# Patient Record
Sex: FEMALE | Race: WHITE | NOT HISPANIC OR LATINO
[De-identification: names, ages, dates, MRNs, and addresses within clinical notes are randomized per-mention and may not be internally consistent; named-entity substitution may affect disease eponyms.]

---

## 2022-07-28 ENCOUNTER — APPOINTMENT (OUTPATIENT)
Age: 77
End: 2022-07-28

## 2022-07-28 ENCOUNTER — LABORATORY RESULT (OUTPATIENT)
Age: 77
End: 2022-07-28

## 2022-07-28 VITALS
BODY MASS INDEX: 17.3 KG/M2 | HEIGHT: 62 IN | TEMPERATURE: 97.9 F | OXYGEN SATURATION: 100 % | SYSTOLIC BLOOD PRESSURE: 159 MMHG | HEART RATE: 65 BPM | DIASTOLIC BLOOD PRESSURE: 81 MMHG | WEIGHT: 94 LBS

## 2022-07-28 PROCEDURE — 99204 OFFICE O/P NEW MOD 45 MIN: CPT

## 2022-07-28 NOTE — CONSULT LETTER
[Dear  ___] : Dear  [unfilled], [Consult Letter:] : I had the pleasure of evaluating your patient, [unfilled]. [Please see my note below.] : Please see my note below. [Consult Closing:] : Thank you very much for allowing me to participate in the care of this patient.  If you have any questions, please do not hesitate to contact me. [Sincerely,] : Sincerely, [FreeTextEntry3] : Felix James MD

## 2022-07-28 NOTE — PHYSICAL EXAM
[FreeTextEntry1] : General: this is a pleasant patient in no acute distress\par \par HEENT conjunctiva are normal, oropharynx is clear, no tenderness in head\par \par CV: normal pulses, regular rate and rhythm, no peripheral edema noted\par \par Lungs: breathing is non-labored\par \par abd: soft and non-distended\par \par MSK:\par SLR: \par GEORGIE:\par range of motion: full C spine\par tinnels: negative at wrists\par spurling:\par Occipital nerve tenderness:\par \par Mental status:\par Alert and oriented to person, place and time\par Memory: consolidates\par Attention: grossly nl\par Language is normal \par \par Cranial Nerves:\par extra-occular movements in tact without nystagmus, normal saccades and smooth pursuit, visual fields full to confrontation, pupils equal, round and reactive to light. Face symmetric and facial strength symmetric, facial sensation symmetric, hearing present bilaterally to finger rub, tongue and uvula midline. neck flexion and extension normal strength.\par \par Motor: normal bulk and tone throughout. no abnormal movements.  Full 5/5 strength uppers and lower extremities proximally and distally except L thumb opposition 4/5\par \par Sensory: in tact and symmetric to vibration, light tough, pin prick, temperature except decreased vib in both feet and ankles, decreased temp to R knee, b/l hip flexion 4/5\par \par Cerebellar: normal finger-nose-finger bilaterally\par \par Reflexes: 2+ in the upper and lower extremities and symmetric.  toes are bilaterally downgoing.\par \par Gait: stable, able to tip toe heel and stumbles with tandem\par \par Stances:\par Romberg: sways and unstable\par Shapened romberg: unstable\par

## 2022-07-28 NOTE — HISTORY OF PRESENT ILLNESS
[FreeTextEntry1] : DANUTA RUANO is a 77 year who presents with paresthesias\par \par Referred by Dr Sunil Prasad\par \par She reports numbness in feet.  Probably at least 5 years of symptoms and mainly happens at night.  Primarily in the toes and feet.  Probably somewhat worse over time.  Much worse on the right.\par \par Also more recently has symptoms down the right leg that is annoying.\par \par Did drop frying pan on foot that made things worse this year\par \par She has bilateral hand stiffness that used to be worse when she was working. Hands have gotten much weaker over time. \par \par Denies diplopia, blurred vision, dysphagia, dysarthria, aphasia, bowel or bladder dysfunction, imbalance, falls. used to have bad migraines but not for a long time.\par

## 2022-07-29 ENCOUNTER — NON-APPOINTMENT (OUTPATIENT)
Age: 77
End: 2022-07-29

## 2022-07-29 LAB
CK SERPL-CCNC: 62 U/L
CRP SERPL-MCNC: <3 MG/L
ERYTHROCYTE [SEDIMENTATION RATE] IN BLOOD BY WESTERGREN METHOD: 2 MM/HR
ESTIMATED AVERAGE GLUCOSE: 111 MG/DL
HBA1C MFR BLD HPLC: 5.5 %
HCV AB SER QL: NONREACTIVE
HCV S/CO RATIO: 0.11 S/CO
HIV1+2 AB SPEC QL IA.RAPID: NONREACTIVE
T4 FREE SERPL-MCNC: 1.2 NG/DL
TSH SERPL-ACNC: 2.56 UIU/ML
VIT B12 SERPL-MCNC: 181 PG/ML

## 2022-08-15 LAB
ANA SER IF-ACNC: NEGATIVE
ASIALO-GM1 ANTIBODIES, IGG/IGM: 10 IV
ENA SS-A AB SER IA-ACNC: <0.2 AL
ENA SS-B AB SER IA-ACNC: <0.2 AL
ENDOMYSIUM IGA SER QL: NEGATIVE
ENDOMYSIUM IGA TITR SER: NORMAL
GD1A ANTIBODIES, IGG/IGM: 9 IV
GD1B ANTIBODIES, IGG/IGM: 18 IV
GLIADIN IGA SER QL: 5.2 UNITS
GLIADIN IGG SER QL: <5 UNITS
GLIADIN PEPTIDE IGA SER-ACNC: NEGATIVE
GLIADIN PEPTIDE IGG SER-ACNC: NEGATIVE
GM1 ANTIBODIES, IGG/IGM: 9 IV
GM2 ANTIBODIES, IGG/IGM: 13 IV
GQ1B ANTIBODIES, IGG/IGM: 8 IV
M PROTEIN SPEC IFE-MCNC: NORMAL
MAG AB SER QL: NEGATIVE
PARANEOPLASTIC AB PNL SER: NORMAL
TTG IGA SER IA-ACNC: <1.2 U/ML
TTG IGA SER-ACNC: NEGATIVE
TTG IGG SER IA-ACNC: <1.2 U/ML
TTG IGG SER IA-ACNC: NEGATIVE
VIT B1 SERPL-MCNC: 120.2 NMOL/L
VIT B6 SERPL-MCNC: 11.8 UG/L

## 2022-09-08 ENCOUNTER — NON-APPOINTMENT (OUTPATIENT)
Age: 77
End: 2022-09-08

## 2022-09-28 ENCOUNTER — APPOINTMENT (OUTPATIENT)
Age: 77
End: 2022-09-28

## 2022-10-13 ENCOUNTER — APPOINTMENT (OUTPATIENT)
Age: 77
End: 2022-10-13

## 2022-10-13 DIAGNOSIS — R20.2 PARESTHESIA OF SKIN: ICD-10-CM

## 2022-10-13 PROCEDURE — 95886 MUSC TEST DONE W/N TEST COMP: CPT

## 2022-10-13 PROCEDURE — 99214 OFFICE O/P EST MOD 30 MIN: CPT | Mod: 25

## 2022-10-13 PROCEDURE — 95911 NRV CNDJ TEST 9-10 STUDIES: CPT

## 2022-10-13 NOTE — PHYSICAL EXAM
[FreeTextEntry1] : General: this is a pleasant patient in no acute distress\par \par HEENT conjunctiva are normal, oropharynx is clear, no tenderness in head\par \par CV: normal pulses, regular rate and rhythm, no peripheral edema noted\par \par Lungs: breathing is non-labored\par \par abd: soft and non-distended\par \par MSK:\par SLR: \par GEORGIE:\par range of motion: full C spine\par tinnels: negative at wrists\par spurling:\par Occipital nerve tenderness:\par \par Mental status:\par Alert and oriented to person, place and time\par Memory: consolidates\par Attention: grossly nl\par Language is normal \par \par Cranial Nerves:\par extra-occular movements in tact without nystagmus, normal saccades and smooth pursuit, visual fields full to confrontation, pupils equal, round and reactive to light. Face symmetric and facial strength symmetric, facial sensation symmetric, hearing present bilaterally to finger rub, tongue and uvula midline. neck flexion and extension normal strength.\par \par Motor: normal bulk and tone throughout. no abnormal movements.  Full 5/5 strength uppers and lower extremities proximally and distally, hip flexion better than last visit\par \par Sensory: in tact and symmetric to vibration, light tough, pin prick, temperature except decreased vib in both feet and ankles, decreased temp to R knee,\par \par Cerebellar: normal finger-nose-finger bilaterally\par \par Reflexes: 2+ in the upper and lower extremities and symmetric.  toes are bilaterally downgoing.\par \par Gait: stable, able to tip toe heel and difficulty with tandem\par \par Stances:\par Romberg: sways and but more stable\par Shapened romberg: unstable\par

## 2022-10-13 NOTE — ASSESSMENT
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 this accounts for her percieved asymmetry of symptoms\par \par after total 6 months of injected b12 would switch to oral and check level, then re-check level after 3 months on oral

## 2022-10-13 NOTE — CONSULT LETTER
[Dear  ___] : Dear  [unfilled], [Courtesy Letter:] : I had the pleasure of seeing your patient, [unfilled], in my office today. [Please see my note below.] : Please see my note below. [Consult Closing:] : Thank you very much for allowing me to participate in the care of this patient.  If you have any questions, please do not hesitate to contact me. [Sincerely,] : Sincerely, [FreeTextEntry3] : Felix James MD

## 2022-10-13 NOTE — HISTORY OF PRESENT ILLNESS
[FreeTextEntry1] : DANUTA RUANO is a 77 year who returns to follow up for possible neuropathy\par \par last visit was 7/28/2022\par \par since last visit did go for injections of b12 by Dr Schroeder.  almost done with a month.\par \par then got worse, we had spoken on the phone on 9/8 about this and decided to bring her back for EMG.  she reports that since then she is actually getting a bit better\par \par \par

## 2022-10-13 NOTE — DATA REVIEWED
[de-identified] : XR L spine with misalignment and pretty severe DJD\par labs 7/29/2022 b12 181 otherwise unrevealing: HIV, CRP, TSH, Ft4, CK, ESR, hep C, A1C, lyme, sjogren, ganglioside, celiac, b6, b1, faby, immunofixation, pareneoplastic, mag

## 2023-02-10 ENCOUNTER — APPOINTMENT (OUTPATIENT)
Age: 78
End: 2023-02-10
Payer: MEDICARE

## 2023-02-10 VITALS
TEMPERATURE: 98 F | HEART RATE: 65 BPM | WEIGHT: 92 LBS | SYSTOLIC BLOOD PRESSURE: 169 MMHG | BODY MASS INDEX: 16.93 KG/M2 | HEIGHT: 62 IN | DIASTOLIC BLOOD PRESSURE: 82 MMHG | OXYGEN SATURATION: 98 %

## 2023-02-10 PROCEDURE — 99214 OFFICE O/P EST MOD 30 MIN: CPT

## 2023-02-10 NOTE — PHYSICAL EXAM
[FreeTextEntry1] : General: this is a pleasant patient in no acute distress\par \par HEENT conjunctiva are normal, oropharynx is clear, no tenderness in head\par \par CV: normal pulses, regular rate and rhythm, no peripheral edema noted\par \par Lungs: breathing is non-labored\par \par abd: soft and non-distended\par \par MSK: b/l hands red with some swelling worst in PIP joints\par SLR: \par GEORGIE:\par range of motion: full C spine\par tinnels: negative at wrists\par spurling:\par Occipital nerve tenderness:\par \par Mental status:\par Alert and oriented to person, place and time\par Memory: consolidates\par Attention: grossly nl\par Language is normal \par \par Cranial Nerves:\par extra-occular movements in tact without nystagmus, normal saccades and smooth pursuit, visual fields full to confrontation, pupils equal, round and reactive to light. Face symmetric and facial strength symmetric, facial sensation symmetric, hearing present bilaterally to finger rub, tongue and uvula midline. neck flexion and extension normal strength.\par \par Motor: normal bulk and tone throughout. no abnormal movements.  Full 5/5 strength uppers and lower extremities proximally and distally, R hip flexion 4/5, L hip flexion 4+/5, b/l hip abduction 4+/5\par \par Sensory: in tact and symmetric to vibration, light tough, pin prick, temperature except decreased vib in both feet and ankles, decreased temp to R knee,\par \par Cerebellar: normal finger-nose-finger bilaterally\par \par Reflexes: 2+ in the upper and lower extremities and symmetric.  toes are bilaterally downgoing.\par \par Gait: stable, able to tip toe heel and more difficulty with tandem\par \par Stances:\par Romberg: sways but able to maintain\par Shapened romberg: unstable\par

## 2023-02-10 NOTE — HISTORY OF PRESENT ILLNESS
[FreeTextEntry1] : DANUTA RUANO is a 77 year who returns to follow up for possible neuroapthy\par \par last visit was 10/13/2022 for EMG\par \par since last visit having a lot of muscle stiffness and pain.  also has had fecal incontinence. this has been for a couple of weeks and has used a pad.  it has sligtly happened in years past but never like this.  legs also feel more numb in right more than left toes.  \par \par continues b12 injections\par \par hands have been getting red this year. skin not healing well on hands, having large cracks develop.\par \par \par

## 2023-02-10 NOTE — DATA REVIEWED
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

## 2023-03-29 ENCOUNTER — OUTPATIENT (OUTPATIENT)
Dept: OUTPATIENT SERVICES | Facility: HOSPITAL | Age: 78
LOS: 1 days | End: 2023-03-29
Payer: MEDICARE

## 2023-03-29 ENCOUNTER — APPOINTMENT (OUTPATIENT)
Dept: MRI IMAGING | Facility: HOSPITAL | Age: 78
End: 2023-03-29

## 2023-03-29 PROCEDURE — 72148 MRI LUMBAR SPINE W/O DYE: CPT

## 2023-03-29 PROCEDURE — 72148 MRI LUMBAR SPINE W/O DYE: CPT | Mod: 26

## 2023-04-03 ENCOUNTER — APPOINTMENT (OUTPATIENT)
Dept: ORTHOPEDIC SURGERY | Facility: CLINIC | Age: 78
End: 2023-04-03
Payer: MEDICARE

## 2023-04-03 VITALS
HEIGHT: 62 IN | OXYGEN SATURATION: 96 % | DIASTOLIC BLOOD PRESSURE: 71 MMHG | BODY MASS INDEX: 17.48 KG/M2 | HEART RATE: 82 BPM | SYSTOLIC BLOOD PRESSURE: 114 MMHG | WEIGHT: 95 LBS

## 2023-04-03 PROCEDURE — 99204 OFFICE O/P NEW MOD 45 MIN: CPT

## 2023-04-03 PROCEDURE — 72083 X-RAY EXAM ENTIRE SPI 4/5 VW: CPT

## 2023-04-21 ENCOUNTER — APPOINTMENT (OUTPATIENT)
Dept: CT IMAGING | Facility: HOSPITAL | Age: 78
End: 2023-04-21
Payer: MEDICARE

## 2023-04-21 ENCOUNTER — OUTPATIENT (OUTPATIENT)
Dept: OUTPATIENT SERVICES | Facility: HOSPITAL | Age: 78
LOS: 1 days | End: 2023-04-21
Payer: MEDICARE

## 2023-04-21 PROCEDURE — 72131 CT LUMBAR SPINE W/O DYE: CPT

## 2023-04-21 PROCEDURE — 72131 CT LUMBAR SPINE W/O DYE: CPT | Mod: 26

## 2023-05-01 NOTE — DISCUSSION/SUMMARY
[de-identified] : Severe mutilevel stenosis; numnbess in lower extremities may be related to stenosis vs. polyneuropoathy from emg vs. both. scoliosis and severe lumbar degenerative changes.  history of \par Recommend Endocrine consult (referral given)\par Recommend urology consult\par CT lumbar\par Follow up after testing

## 2023-05-01 NOTE — HISTORY OF PRESENT ILLNESS
[de-identified] : Referred by dr. jl dias.  has had intermittent incontinence for the past year.  Reports numbness in the feet chronic. now having increased numbness in legs..  does have intermittent radicular symtpoms.

## 2023-05-01 NOTE — PHYSICAL EXAM
[de-identified] : Physical Exam:\par \par General: patient is well developed, well nourished, in no acute \par distress, alert and oriented x 3. \par \par Mood and affect: normal\par \par Respiratory: no respiratory distress noted\par \par Skin: no scars over spine, skin intact, no erythema, increased warmth\par \par Alignment:The spine is well compensated in the coronal and sagittal plane.  \par \par Gait: The patient is able to toe walk and heel walk without difficulty. The patient is able to tandem gait without difficutly.\par \par Palpation: no tenderness to palpation spine or paraspinal region\par \par Range of motion: Lumbar spine ROM is restricted\par \par Neurologic Exam:\par Motor: Manual Muscle testing in the lower extremities is 5 out of 5 in all muscle groups. There is no evidence of muscular atrophy in the lower extremities. Sensory: Sensation to light touch is grossly intact in the lower extremities\par \par Reflexes: DTR are present and symmetric throughout, no clonus, plantar responses are flexor\par \par Hip Exam: No pain with internal or external rotation of hips bilaterally\par \par Special tests: Straight leg raise test negative.  Cross straight leg test negative.  GEORGIE test negative\par \par Vascular: Examination of the peripheral vascular system demonstrates no evidence of congestion or edema. no evidence of lymphedema bilateral lower extremities, pulses are present and symmetric in both lower extremities. [de-identified] : Xray 4/3/2023: my read: scoliosis, lumbar flatback from severe degenerative disc disease l3/4, L4/5, L5/S1.  Grade 1/2 spondylolisthesis of L4/5.  significant pelvic compensation with moderate forward SVA.\par \par Lumbar MRI 3/29/23: T12/L1: mild to moderate stenosis; L1/2: moderate to severe stenosis; L2/3: moderate to severe canal stenosis; L3/4:severe canal stenosis; L4/5 severe bilateral lateral recess stenosis, moderate to severe canal stenosis; L5/s1: moderate lateral recess stenosis, moderate foarminal stenosis; right severe foraminal stenosis from scoliosis and degenerative changes l2/3, l3/4, l4/5

## 2023-05-19 ENCOUNTER — APPOINTMENT (OUTPATIENT)
Age: 78
End: 2023-05-19
Payer: MEDICARE

## 2023-05-19 DIAGNOSIS — G62.9 POLYNEUROPATHY, UNSPECIFIED: ICD-10-CM

## 2023-05-19 DIAGNOSIS — G57.31 LESION OF LATERAL POPLITEAL NERVE, RIGHT LOWER LIMB: ICD-10-CM

## 2023-05-19 PROCEDURE — 99214 OFFICE O/P EST MOD 30 MIN: CPT

## 2023-05-19 NOTE — HISTORY OF PRESENT ILLNESS
[FreeTextEntry1] : DANUTA RUANO is a 77 year who returns to follow up for numbness in the legs\par \par last visit was 2/10/2023\par \par since last visit had imaging, here to review\par \par \par

## 2023-05-19 NOTE — DATA REVIEWED
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

## 2023-05-19 NOTE — CONSULT LETTER
[Dear  ___] : Dear  [unfilled], [Please see my note below.] : Please see my note below. [Consult Closing:] : Thank you very much for allowing me to participate in the care of this patient.  If you have any questions, please do not hesitate to contact me. [Sincerely,] : Sincerely, [FreeTextEntry1] : Ms Orozco is transferring to your care for primary care.  I saw her in the office today. [FreeTextEntry3] : Felix James MD

## 2023-05-23 ENCOUNTER — APPOINTMENT (OUTPATIENT)
Dept: ORTHOPEDIC SURGERY | Facility: CLINIC | Age: 78
End: 2023-05-23
Payer: MEDICARE

## 2023-05-23 PROCEDURE — 99214 OFFICE O/P EST MOD 30 MIN: CPT

## 2023-06-12 ENCOUNTER — APPOINTMENT (OUTPATIENT)
Dept: ORTHOPEDIC SURGERY | Facility: CLINIC | Age: 78
End: 2023-06-12
Payer: MEDICARE

## 2023-06-12 PROCEDURE — 99214 OFFICE O/P EST MOD 30 MIN: CPT

## 2023-06-15 NOTE — END OF VISIT
[FreeTextEntry3] : All medical record entries made by the Scribe were at my, Dr. Scott Tomas, direction and personally dictated by me on 06/12/2023. I have reviewed the chart and agree that the record accurately reflects my personal performance of the history, physical exam, assessment and plan. I have also personally directed, reviewed, and agreed with the chart.

## 2023-06-15 NOTE — DISCUSSION/SUMMARY
[de-identified] : Severe mutilevel stenosis; numbness in lower extremities may be related to stenosis vs. polyneuropoathy from emg vs. both. scoliosis and severe lumbar degenerative changes, Cervical disc disease, cervical foraminal stenosis\par \par I discussed my findings with the patient and her . Given the extent of her low back pathology and relative mild symptoms at this point, it seems prudent to build up her bone density with an anabolic agent in the mean time as her surgery would be extensive with higher than average risk of mechanical complications due to osteoporosis. She will follow up with me in 3 to 4 months, certainly sooner if there are any issues. She has an upcoming appointment with Dr. Almaraz of Endocrinology for her bone density.

## 2023-06-15 NOTE — PHYSICAL EXAM
[de-identified] : Physical Exam:\par \par General: patient is well developed, well nourished, in no acute \par distress, alert and oriented x 3. \par \par Mood and affect: normal\par \par Respiratory: no respiratory distress noted\par \par Skin: no scars over spine, skin intact, no erythema, increased warmth\par \par Alignment:The spine is well compensated in the coronal and sagittal plane.  \par \par Gait: The patient is able to toe walk and heel walk without difficulty. The patient is able to tandem gait without difficutly.\par \par Palpation: no tenderness to palpation spine or paraspinal region\par \par Range of motion: Lumbar spine ROM is restricted\par \par Neurologic Exam:\par Motor: Manual Muscle testing in the lower extremities is 5 out of 5 in all muscle groups. There is no evidence of muscular atrophy in the lower extremities. Sensory: Sensation to light touch is grossly intact in the lower extremities\par \par Reflexes: DTR are present and symmetric throughout, no clonus, plantar responses are flexor\par \par Hip Exam: No pain with internal or external rotation of hips bilaterally\par \par Special tests: Straight leg raise test negative.  Cross straight leg test negative.  GEORGIE test negative\par \par Vascular: Examination of the peripheral vascular system demonstrates no evidence of congestion or edema. no evidence of lymphedema bilateral lower extremities, pulses are present and symmetric in both lower extremities. [de-identified] : MRI Cervical Spine noncontrast 06/08/2023 [my read]: Multilevel disc degeneration, severe, with facet arthropathy. At C5/6 and C6/7 there is mild to moderate central stenosis and some cord effacement without significant compression or cord signal change. There is multilevel foraminal stenosis. \par \par CT Lumbar Spine 04/21/2023: Severe degenerative disc disease and scoliosis thoracolumbar spine. Grade 1 spondylolisthesis of L3/4, L4/5, and L5/S1. No pars defects seen. Review of her bone density of the lumbar spine demonstrates Hounsfield Units of approximately 120 to 140. \par \par Xray 4/3/2023: my read: scoliosis, lumbar flatback from severe degenerative disc disease L3/4, L4/5, L5/S1.  Grade 1/2 spondylolisthesis of L4/5.  significant pelvic compensation with moderate forward SVA.\par \par Lumbar MRI 3/29/23: T12/L1: mild to moderate stenosis; L1/2: moderate to severe stenosis; L2/3: moderate to severe canal stenosis; L3/4:severe canal stenosis; L4/5 severe bilateral lateral recess stenosis, moderate to severe canal stenosis; L5/s1: moderate lateral recess stenosis, moderate foarminal stenosis; right severe foraminal stenosis from scoliosis and degenerative changes l2/3, L3/4, l4/5

## 2023-06-15 NOTE — ADDENDUM
[FreeTextEntry1] : Documented by Lorin Perdomo acting as a scribe for Dr. Scott Tomas on 06/13/2023.

## 2023-06-16 NOTE — ADDENDUM
[FreeTextEntry1] : Documented by Lorin Perdomo acting as a scribe for Dr. Scott Tomas on 05/31/2023.

## 2023-06-16 NOTE — PHYSICAL EXAM
[de-identified] : Physical Exam:\par \par General: patient is well developed, well nourished, in no acute \par distress, alert and oriented x 3. \par \par Mood and affect: normal\par \par Respiratory: no respiratory distress noted\par \par Skin: no scars over spine, skin intact, no erythema, increased warmth\par \par Alignment:The spine is well compensated in the coronal and sagittal plane.  \par \par Gait: The patient is able to toe walk and heel walk without difficulty. The patient is able to tandem gait without difficutly.\par \par Palpation: no tenderness to palpation spine or paraspinal region\par \par Range of motion: Lumbar spine ROM is restricted\par \par Neurologic Exam:\par Motor: Manual Muscle testing in the lower extremities is 5 out of 5 in all muscle groups. There is no evidence of muscular atrophy in the lower extremities. Sensory: Sensation to light touch is grossly intact in the lower extremities\par \par Reflexes: DTR are present and symmetric throughout, no clonus, plantar responses are flexor\par \par Hip Exam: No pain with internal or external rotation of hips bilaterally\par \par Special tests: Straight leg raise test negative.  Cross straight leg test negative.  GEORGIE test negative\par \par Vascular: Examination of the peripheral vascular system demonstrates no evidence of congestion or edema. no evidence of lymphedema bilateral lower extremities, pulses are present and symmetric in both lower extremities. [de-identified] : CT Lumbar Spine 04/21/2023: Severe degenerative disc disease and scoliosis thoracolumbar spine. Grade 1 spondylolisthesis of L3/4, L4/5, and L5/S1. No pars defects seen. Review of her bone density of the lumbar spine demonstrates Hounsfield Units of approximately 120 to 140. \par \par Xray 4/3/2023: my read: scoliosis, lumbar flatback from severe degenerative disc disease L3/4, L4/5, L5/S1.  Grade 1/2 spondylolisthesis of L4/5.  significant pelvic compensation with moderate forward SVA.\par \par Lumbar MRI 3/29/23: T12/L1: mild to moderate stenosis; L1/2: moderate to severe stenosis; L2/3: moderate to severe canal stenosis; L3/4:severe canal stenosis; L4/5 severe bilateral lateral recess stenosis, moderate to severe canal stenosis; L5/s1: moderate lateral recess stenosis, moderate foarminal stenosis; right severe foraminal stenosis from scoliosis and degenerative changes l2/3, L3/4, l4/5

## 2023-06-16 NOTE — END OF VISIT
[FreeTextEntry3] : All medical record entries made by the Scribe were at my, Dr. Scott Tomas, direction and personally dictated by me on 05/23/2023. I have reviewed the chart and agree that the record accurately reflects my personal performance of the history, physical exam, assessment and plan. I have also personally directed, reviewed, and agreed with the chart.

## 2023-06-16 NOTE — DISCUSSION/SUMMARY
[de-identified] : Severe mutilevel stenosis; numbness in lower extremities may be related to stenosis vs. polyneuropoathy from emg vs. both. scoliosis and severe lumbar degenerative changes. \par \par I discussed my findings with the patient. The patient does not wish to undergo surgery at this time. She would also like to address her bone density before considering surgery. She is neurologically stable, and I think this is reasonable. We  discussed that at some point she will need surgery for her lumbar spine, and this would likely come in the form of a thoracolumbar fusion with a multilevel lumbar decompression. The patient should follow up with me after her visit with Dr. Almaraz. She should follow up with me sooner if needed.

## 2023-06-16 NOTE — HISTORY OF PRESENT ILLNESS
[de-identified] : Follow up 05/23/2023: Ms. Orozco is here for follow up. She had a CT of her Lumbar Spine and is here for review. She reports that she has not had any issues with urinary incontinence since her last visit with me 04/03/2023. She has been walking and functioning well though does feel numbness in her feet that is chronic and has had increased numbness in her legs over the past 6 to 12 months. She does have an intermittent radicular symptoms. She has an upcoming appointment with Dr. Orly Almaraz.\par \par Initial visit 04/03/2023: Referred by dr. jl dias.  has had intermittent incontinence for the past year.  Reports numbness in the feet chronic. now having increased numbness in legs..  does have intermittent radicular symtpoms.

## 2023-07-13 ENCOUNTER — APPOINTMENT (OUTPATIENT)
Dept: ENDOCRINOLOGY | Facility: CLINIC | Age: 78
End: 2023-07-13
Payer: MEDICARE

## 2023-07-13 VITALS
DIASTOLIC BLOOD PRESSURE: 85 MMHG | HEART RATE: 59 BPM | HEIGHT: 62 IN | BODY MASS INDEX: 17.48 KG/M2 | WEIGHT: 95 LBS | SYSTOLIC BLOOD PRESSURE: 152 MMHG

## 2023-07-13 DIAGNOSIS — Z87.891 PERSONAL HISTORY OF NICOTINE DEPENDENCE: ICD-10-CM

## 2023-07-13 DIAGNOSIS — Z82.62 FAMILY HISTORY OF OSTEOPOROSIS: ICD-10-CM

## 2023-07-13 LAB
25(OH)D3 SERPL-MCNC: 31.7 NG/ML
ALBUMIN SERPL ELPH-MCNC: 4.8 G/DL
ALP BLD-CCNC: 84 U/L
ALT SERPL-CCNC: 24 U/L
ANION GAP SERPL CALC-SCNC: 10 MMOL/L
AST SERPL-CCNC: 21 U/L
BILIRUB SERPL-MCNC: 1.1 MG/DL
BUN SERPL-MCNC: 20 MG/DL
CALCIUM SERPL-MCNC: 10.4 MG/DL
CALCIUM SERPL-MCNC: 10.4 MG/DL
CHLORIDE SERPL-SCNC: 102 MMOL/L
CO2 SERPL-SCNC: 27 MMOL/L
CREAT SERPL-MCNC: 0.64 MG/DL
EGFR: 91 ML/MIN/1.73M2
GLUCOSE SERPL-MCNC: 87 MG/DL
MAGNESIUM SERPL-MCNC: 2.3 MG/DL
PARATHYROID HORMONE INTACT: 66 PG/ML
PHOSPHATE SERPL-MCNC: 3.5 MG/DL
POTASSIUM SERPL-SCNC: 4.9 MMOL/L
PROT SERPL-MCNC: 6.8 G/DL
SODIUM SERPL-SCNC: 139 MMOL/L
TSH SERPL-ACNC: 2.01 UIU/ML

## 2023-07-13 PROCEDURE — 99204 OFFICE O/P NEW MOD 45 MIN: CPT

## 2023-07-13 RX ORDER — CYANOCOBALAMIN 1000 UG/ML
1000 INJECTION INTRAMUSCULAR; SUBCUTANEOUS
Qty: 2 | Refills: 0 | Status: DISCONTINUED | COMMUNITY
Start: 2022-08-01 | End: 2023-07-13

## 2023-07-14 LAB
ALBUMIN MFR SERPL ELPH: 63.3 %
ALBUMIN SERPL-MCNC: 4.3 G/DL
ALBUMIN/GLOB SERPL: 1.7 RATIO
ALPHA1 GLOB MFR SERPL ELPH: 4.3 %
ALPHA1 GLOB SERPL ELPH-MCNC: 0.3 G/DL
ALPHA2 GLOB MFR SERPL ELPH: 11.5 %
ALPHA2 GLOB SERPL ELPH-MCNC: 0.8 G/DL
B-GLOBULIN MFR SERPL ELPH: 10.2 %
B-GLOBULIN SERPL ELPH-MCNC: 0.7 G/DL
GAMMA GLOB FLD ELPH-MCNC: 0.7 G/DL
GAMMA GLOB MFR SERPL ELPH: 10.7 %
INTERPRETATION SERPL IEP-IMP: NORMAL
PROT SERPL-MCNC: 6.8 G/DL
PROT SERPL-MCNC: 6.8 G/DL

## 2023-07-15 ENCOUNTER — NON-APPOINTMENT (OUTPATIENT)
Age: 78
End: 2023-07-15

## 2023-07-17 ENCOUNTER — APPOINTMENT (OUTPATIENT)
Dept: INTERNAL MEDICINE | Facility: CLINIC | Age: 78
End: 2023-07-17
Payer: MEDICARE

## 2023-07-17 VITALS
WEIGHT: 95 LBS | OXYGEN SATURATION: 100 % | RESPIRATION RATE: 16 BRPM | HEART RATE: 58 BPM | SYSTOLIC BLOOD PRESSURE: 169 MMHG | BODY MASS INDEX: 17.48 KG/M2 | TEMPERATURE: 94 F | DIASTOLIC BLOOD PRESSURE: 75 MMHG | HEIGHT: 62 IN

## 2023-07-17 DIAGNOSIS — B37.31 ACUTE CANDIDIASIS OF VULVA AND VAGINA: ICD-10-CM

## 2023-07-17 LAB
ALBUPE: 23.3 %
ALPHA1UPE: 32.6 %
ALPHA2UPE: 20.7 %
BETAUPE: 11.8 %
GAMMAUPE: 11.6 %
IGA 24H UR QL IFE: NORMAL
KAPPA LC 24H UR QL: NORMAL
PROT PATTERN 24H UR ELPH-IMP: NORMAL
PROT UR-MCNC: 4 MG/DL
PROT UR-MCNC: 4 MG/DL

## 2023-07-17 PROCEDURE — 99203 OFFICE O/P NEW LOW 30 MIN: CPT | Mod: 25

## 2023-07-17 PROCEDURE — 96372 THER/PROPH/DIAG INJ SC/IM: CPT

## 2023-07-17 RX ORDER — CETIRIZINE HYDROCHLORIDE 10 MG/1
10 TABLET, FILM COATED ORAL DAILY
Qty: 90 | Refills: 3 | Status: ACTIVE | COMMUNITY
Start: 2023-07-17

## 2023-07-17 RX ORDER — CYANOCOBALAMIN 1000 UG/ML
1000 INJECTION INTRAMUSCULAR; SUBCUTANEOUS
Qty: 0 | Refills: 0 | Status: COMPLETED | OUTPATIENT
Start: 2023-07-17

## 2023-07-17 RX ORDER — LISINOPRIL 30 MG/1
TABLET ORAL
Refills: 0 | Status: COMPLETED | COMMUNITY
End: 2023-07-17

## 2023-07-17 RX ORDER — FLUCONAZOLE 150 MG/1
150 TABLET ORAL
Qty: 1 | Refills: 0 | Status: COMPLETED | COMMUNITY
Start: 2023-07-17 | End: 2023-07-17

## 2023-07-17 RX ORDER — MINOCYCLINE HYDROCHLORIDE 100 MG/1
100 CAPSULE ORAL DAILY
Refills: 0 | Status: COMPLETED | COMMUNITY
Start: 2023-07-17 | End: 2023-07-17

## 2023-07-17 RX ORDER — DIPHENHYDRAMINE HYDROCHLORIDE 12.5 MG/5ML
12.5 SOLUTION ORAL 3 TIMES DAILY
Refills: 0 | Status: ACTIVE | COMMUNITY
Start: 2023-07-17

## 2023-07-17 RX ADMIN — CYANOCOBALAMIN 0 MCG/ML: 1000 INJECTION INTRAMUSCULAR; SUBCUTANEOUS at 00:00

## 2023-07-17 NOTE — HISTORY OF PRESENT ILLNESS
[FreeTextEntry8] : annual due 12/2023\par here to est care; previous PCP passed away\par \par h/o anxiety\par - needs xanax refill\par \par h/o B12 def\par - on monthly b12 injection; pt gets it from pharmacy and is administered in office\par \par OP\par - follows w/ endo\par - needs to be treated before ortho can address spine issues\par \par multiple allergies\par - on topical steroids as needed; unusually in the winter months

## 2023-07-17 NOTE — PHYSICAL EXAM
[No Acute Distress] : no acute distress [Well Nourished] : well nourished [Well Developed] : well developed [Well-Appearing] : well-appearing [Normal Sclera/Conjunctiva] : normal sclera/conjunctiva [EOMI] : extraocular movements intact [Normal Outer Ear/Nose] : the outer ears and nose were normal in appearance [No Respiratory Distress] : no respiratory distress  [No Accessory Muscle Use] : no accessory muscle use [Clear to Auscultation] : lungs were clear to auscultation bilaterally [Normal Rate] : normal rate  [Regular Rhythm] : with a regular rhythm [Normal S1, S2] : normal S1 and S2 [No Murmur] : no murmur heard [No Edema] : there was no peripheral edema [No HSM] : no HSM [No Focal Deficits] : no focal deficits [Normal Gait] : normal gait [Normal Affect] : the affect was normal [Alert and Oriented x3] : oriented to person, place, and time [Normal Mood] : the mood was normal [Normal Insight/Judgement] : insight and judgment were intact

## 2023-07-18 ENCOUNTER — RX RENEWAL (OUTPATIENT)
Age: 78
End: 2023-07-18

## 2023-07-19 LAB — ALP BONE SERPL-MCNC: 16.7 UG/L

## 2023-08-01 ENCOUNTER — NON-APPOINTMENT (OUTPATIENT)
Age: 78
End: 2023-08-01

## 2023-08-01 ENCOUNTER — TRANSCRIPTION ENCOUNTER (OUTPATIENT)
Age: 78
End: 2023-08-01

## 2023-08-03 ENCOUNTER — APPOINTMENT (OUTPATIENT)
Dept: UROLOGY | Facility: CLINIC | Age: 78
End: 2023-08-03

## 2023-08-03 ENCOUNTER — NON-APPOINTMENT (OUTPATIENT)
Age: 78
End: 2023-08-03

## 2023-08-03 NOTE — DATA REVIEWED
[FreeTextEntry1] : Most recent bone mineral density\par Date: June 6, 2022\par Source: Hologic\par Site: [facility]\par \par Site	BMD	T-score	Change previous	Change baseline	\par Lumbar spine	0.876	-1.6			\par Femoral neck	0.457	-3.5			\par Total hip                  	0.593	-2.9			\par Distal radius					\par DXA comments: Baseline scan at this facility; left hip values\par \par Impression: I have personally reviewed the DXA images and report. There is osteoporosis by the World Health Organization criteria.

## 2023-08-03 NOTE — HISTORY OF PRESENT ILLNESS
[FreeTextEntry1] : Ms. Orozco is a 78 year-old woman presenting to [facility] care with me for osteoporosis.\par \par Bone History\par Osteoporosis diagnosed many years ago on routine bone density (no report available); bone density testing in 2022 significant for T-scores of -1.6 at the lumbar spine, -3.5 at the femoral neck, and -2.9 at the total hip\par Fracture history: History of multiple toe fractures\par Family history: Mother and daughter with history of osteoporosis; mother with history of hip fracture\par Treatment: Remote history of ibandronate use with multiple side effects\par \par Falls: No\par Height loss: Up to six inches\par Kidney stones: No\par Dental health: Irregular appointments, no history of implants, no upcoming procedures planned\par Exercise: Walks at least 30 minutes most days\par Dairy intake: 0-1 servings daily (occasional cheese)\par Calcium supplements: None\par Multivitamin: None\par Vitamin D supplements: Does not remember dose\par \par Osteoporosis risk factors include: Postmenopausal status,  race, prior fracture, falls, height loss, small thin bones, tobacco use, excessive alcohol, anorexia, family history, vitamin D deficiency, corticosteroid use, seizure medications, malabsorption, hyperparathyroidism, hyperthyroidism.\par NEGATIVE EXCEPT: Postmenopausal status,  race, family history

## 2023-08-03 NOTE — PHYSICAL EXAM
[Alert] : alert [Healthy Appearance] : healthy appearance [No Acute Distress] : no acute distress [Normal Sclera/Conjunctiva] : normal sclera/conjunctiva [Normal Hearing] : hearing was normal [No Neck Mass] : no neck mass was observed [No LAD] : no lymphadenopathy [Supple] : the neck was supple [Thyroid Not Enlarged] : the thyroid was not enlarged [No Respiratory Distress] : no respiratory distress [Clear to Auscultation] : lungs were clear to auscultation bilaterally [Normal S1, S2] : normal S1 and S2 [Normal Rate] : heart rate was normal [Regular Rhythm] : with a regular rhythm [No Spinal Tenderness] : no spinal tenderness [No Stigmata of Cushings Syndrome] : no stigmata of Cushings Syndrome [Normal Gait] : normal gait [Normal Insight/Judgement] : insight and judgment were intact [Scoliosis] : scoliosis present [Kyphosis] : no kyphosis present [Acanthosis Nigricans] : no acanthosis nigricans [de-identified] : no moon facies, no supraclavicular fat pads [de-identified] : + difficulty balancing on one leg bilaterally

## 2023-08-03 NOTE — ADDENDUM
[FreeTextEntry1] : Recent laboratory results as below; discussed with Ms. Orozco. PTH borderline elevated with normal serum calcium; her hyperparathyroidism may be due to low calcium intake with recommendation to increase as above. Results otherwise unremarkable for a secondary cause of bone loss. We can proceed with romosozumab pending insurance authorization. 7/17/23  I spoke with Ms. Orozco about risks and benefits of romosozumab. She has a $30 copay for each injection and will discuss patient assistance with the infusion center. 8/03/23

## 2023-08-03 NOTE — ASSESSMENT
[FreeTextEntry1] : Osteoporosis. She has no history of fragility fracture. She has a remote history of ibandronate therapy. We discussed completion of a metabolic evaluation for secondary causes of bone loss. We discussed the potential benefits and risks of the osteoanabolic and antiresorptive classes of pharmacologic osteoporosis therapy. Since she has a bone density T-score more than 3.5 standard deviations below normal, we focused on the osteoanabolic class. We discussed the potential benefits and risks of romosozumab, including but not limited to osteonecrosis of the jaw, atypical femoral fracture, cardiovascular risk including heart attack and stroke. We discussed the risks and benefits of PTH/PTHrP analog therapy, including but not limited to hypercalcemia, nausea, orthostatic hypotension, osteosarcoma. We discussed the need for antiresorptive therapy after a course of osteoanabolic therapy so she does not lose the bone that was gained with treatment. We discussed the potential benefits and risks of antiresorptive osteoporosis therapy, including but not limited to osteonecrosis of the jaw and atypical femoral fracture. She is amenable to therapy with romosozumab pending further evaluation and insurance authorization.\par Metabolic evaluation for secondary causes of osteoporosis\par Calcium 1010-9473 mg daily from diet and supplements (to be taken in divided doses as no more than 500-600 mg can be absorbed at one time); advised increased dietary and/or supplemental calcium\par Continue current vitamin D regimen pending level\par Diet, exercise and fall prevention discussed\par Physical therapy for balance and bone health\par \par I reviewed the DXA performed on June 6, 2022 with the patient today.\par I counseled the patient regarding calcium and vitamin D intake today.\par I discussed the following osteoporosis therapies: Alendronate, risedronate, ibandronate, zoledronic acid, denosumab, teriparatide, abaloparatide, romosozumab

## 2023-08-04 ENCOUNTER — APPOINTMENT (OUTPATIENT)
Dept: INFUSION THERAPY | Facility: CLINIC | Age: 78
End: 2023-08-04

## 2023-08-08 ENCOUNTER — APPOINTMENT (OUTPATIENT)
Dept: UROLOGY | Facility: CLINIC | Age: 78
End: 2023-08-08
Payer: MEDICARE

## 2023-08-08 VITALS — DIASTOLIC BLOOD PRESSURE: 77 MMHG | HEART RATE: 70 BPM | SYSTOLIC BLOOD PRESSURE: 177 MMHG | TEMPERATURE: 96.8 F

## 2023-08-08 DIAGNOSIS — R32 UNSPECIFIED URINARY INCONTINENCE: ICD-10-CM

## 2023-08-08 DIAGNOSIS — N32.81 OVERACTIVE BLADDER: ICD-10-CM

## 2023-08-08 DIAGNOSIS — N95.2 POSTMENOPAUSAL ATROPHIC VAGINITIS: ICD-10-CM

## 2023-08-08 PROCEDURE — 99204 OFFICE O/P NEW MOD 45 MIN: CPT

## 2023-08-08 NOTE — ASSESSMENT
[FreeTextEntry1] :   Impression/plan: 78 year old postmenopausal woman with GSM (VA) and OAB-wet.   - Options for management of the patient's overactive bladder and incontinence discussed at length. These included medical therapy, behavioral modification, bladder retraining, and surgical options. Surgical options for third line therapies reviewed included injection of botulinum toxin versus sacral nerve stimulation therapy. The patient would like to start with behavioral modification, bladder retraining and medical therapy.  -prescribed estrace cream. Side effects explained. Advised 1st 2 weeks apply daily followed by M/W/F  RTC 3 months for symptoms check   I, Dr. Floresita Araujo, personally performed the evaluation and management (E/M) services for this new patient.  That E/M includes conducting the clinically appropriate initial history &/or exam, assessing all conditions, and establishing the plan of care.  Today, my ODETTE Rowena, was here to observe &/or participate in the visit & follow plan of care established by me.

## 2023-08-08 NOTE — PHYSICAL EXAM
[General Appearance - Well Developed] : well developed [General Appearance - Well Nourished] : well nourished [Normal Appearance] : normal appearance [Well Groomed] : well groomed [General Appearance - In No Acute Distress] : no acute distress [Edema] : no peripheral edema [Respiration, Rhythm And Depth] : normal respiratory rhythm and effort [Exaggerated Use Of Accessory Muscles For Inspiration] : no accessory muscle use [Abdomen Soft] : soft [Abdomen Tenderness] : non-tender [Costovertebral Angle Tenderness] : no ~M costovertebral angle tenderness [Urethral Meatus] : the meatus of the urethra showed no abnormalities [External Female Genitalia] : normal external genitalia [Vagina] : normal vaginal exam [Normal Station and Gait] : the gait and station were normal for the patient's age [] : no rash [Oriented To Time, Place, And Person] : oriented to person, place, and time [Affect] : the affect was normal [Mood] : the mood was normal [Not Anxious] : not anxious [FreeTextEntry1] : 8/8/23 PE-->atrophy, neg UH, neg CST, no prolapse

## 2023-08-08 NOTE — HISTORY OF PRESENT ILLNESS
[FreeTextEntry1] : 78 year old postmenopausal female with PMH Anxiety, B12 deficiency, Chronic back pain, dermatitis, HTN, osteoporosis, insomnia, IBS female here for occasional leakage (urge-related) and wears ~4 pads daily which are mildly wet but changes pads more for hygiene purposes.   She reports has had frequent UTI in past with her last UTI being late last year as she is less sexually active now. She also reports when she takes antibiotics for UTI she develops yeast infection and has to take medication. She verbalizes that last week she thought she had UTI symptoms of burning and pain took 1 Azo 23 and has been better since.   Denies fevers, chills, nausea, diarrhea, constipation, hematuria, or dysuria  2023 PVR - 12 cc  Force of stream: weaker in the morning  Hesitancy: no intermittency:  Dribbling:  Daytime frequency: 4 Nighttime frequency: 1x Dysuria: slight last night  Urgency: not usually  UUI:  yes sometimes  MEI: rare Pad usage: ~4 Straining to void: no Incomplete bladder emptying: yes UTI: late  last year; less SA now, when SA sometimes a little pain  Hematuria: no Stone disease: no  STD: no Bowel Issue:  constipation no longer on metamucil and has regular BM  Fluid intake and diet: 1-2 cups daily coffee, no tea, yes club sodas seltzer water, loves tomatoes, some spicy foods, no citrus, nightly wine  Pregnancies:  Prolapse sensation:  no Most bothersome symptoms: daily minimal leakage  PMH:  Anxiety, B12 deficiency Chronic back pain dermatitis HTN osteoporosis insomnia IBS  Surghx:  carpel tunnel appendectomy tonsils  Famhx: no  malignancies  Social hx: former smoker quit >50 years ago, wine nightly  Medication: Benadryl, clobetasol, monthly cyanocobalamin injection, losartan, triamcinolone vitamin D, zolpidem, zyrtec, xanax Allergies nuts, grass, trees, raw fruits cherries peaches

## 2023-08-10 ENCOUNTER — RX RENEWAL (OUTPATIENT)
Age: 78
End: 2023-08-10

## 2023-08-15 ENCOUNTER — NON-APPOINTMENT (OUTPATIENT)
Age: 78
End: 2023-08-15

## 2023-08-16 ENCOUNTER — APPOINTMENT (OUTPATIENT)
Dept: INTERNAL MEDICINE | Facility: CLINIC | Age: 78
End: 2023-08-16
Payer: MEDICARE

## 2023-08-16 ENCOUNTER — APPOINTMENT (OUTPATIENT)
Dept: INTERNAL MEDICINE | Facility: CLINIC | Age: 78
End: 2023-08-16

## 2023-08-16 PROCEDURE — 96372 THER/PROPH/DIAG INJ SC/IM: CPT

## 2023-08-16 PROCEDURE — 99214 OFFICE O/P EST MOD 30 MIN: CPT | Mod: 25

## 2023-08-16 RX ORDER — CYANOCOBALAMIN 1000 UG/ML
1000 INJECTION INTRAMUSCULAR; SUBCUTANEOUS
Qty: 0 | Refills: 0 | Status: COMPLETED | OUTPATIENT
Start: 2023-08-16

## 2023-08-16 RX ADMIN — CYANOCOBALAMIN 1 MCG/ML: 1000 INJECTION INTRAMUSCULAR; SUBCUTANEOUS at 00:00

## 2023-08-16 NOTE — HISTORY OF PRESENT ILLNESS
[FreeTextEntry8] : dysuria - present for more than1 week - today had pink colored urine - mild subjective chills and sweats - was evaluated by urology yesterday, urcx pending - would like to stop estradiol cream  B12 def - inj given right deltoid today.

## 2023-08-17 ENCOUNTER — MED ADMIN CHARGE (OUTPATIENT)
Age: 78
End: 2023-08-17

## 2023-08-17 RX ORDER — CYANOCOBALAMIN 1000 UG/ML
1000 INJECTION INTRAMUSCULAR; SUBCUTANEOUS
Qty: 0 | Refills: 0 | Status: COMPLETED | OUTPATIENT
Start: 2023-08-17

## 2023-08-17 RX ADMIN — CYANOCOBALAMIN 0 MCG/ML: 1000 INJECTION INTRAMUSCULAR; SUBCUTANEOUS at 00:00

## 2023-08-21 ENCOUNTER — NON-APPOINTMENT (OUTPATIENT)
Age: 78
End: 2023-08-21

## 2023-08-21 LAB
APPEARANCE: ABNORMAL
BACTERIA UR CULT: ABNORMAL
BACTERIA UR CULT: NORMAL
BACTERIA: NEGATIVE /HPF
BILIRUBIN URINE: NEGATIVE
BLOOD URINE: ABNORMAL
CAST: 0 /LPF
COLOR: YELLOW
EPITHELIAL CELLS: 2 /HPF
GLUCOSE QUALITATIVE U: NEGATIVE MG/DL
KETONES URINE: NEGATIVE MG/DL
LEUKOCYTE ESTERASE URINE: ABNORMAL
MICROSCOPIC-UA: NORMAL
NITRITE URINE: NEGATIVE
PH URINE: 6
PROTEIN URINE: 30 MG/DL
RED BLOOD CELLS URINE: 454 /HPF
SPECIFIC GRAVITY URINE: 1.02
UROBILINOGEN URINE: 0.2 MG/DL
WHITE BLOOD CELLS URINE: 507 /HPF

## 2023-09-01 ENCOUNTER — APPOINTMENT (OUTPATIENT)
Dept: INFUSION THERAPY | Facility: CLINIC | Age: 78
End: 2023-09-01

## 2023-09-12 ENCOUNTER — RX RENEWAL (OUTPATIENT)
Age: 78
End: 2023-09-12

## 2023-09-18 ENCOUNTER — APPOINTMENT (OUTPATIENT)
Dept: INTERNAL MEDICINE | Facility: CLINIC | Age: 78
End: 2023-09-18
Payer: MEDICARE

## 2023-09-18 PROCEDURE — 96372 THER/PROPH/DIAG INJ SC/IM: CPT

## 2023-09-18 RX ORDER — CYANOCOBALAMIN 1000 UG/ML
1000 INJECTION INTRAMUSCULAR; SUBCUTANEOUS
Qty: 0 | Refills: 0 | Status: COMPLETED | OUTPATIENT
Start: 2023-09-18

## 2023-09-18 RX ADMIN — CYANOCOBALAMIN 0 MCG/ML: 1000 INJECTION INTRAMUSCULAR; SUBCUTANEOUS at 00:00

## 2023-09-29 ENCOUNTER — APPOINTMENT (OUTPATIENT)
Dept: INFUSION THERAPY | Facility: CLINIC | Age: 78
End: 2023-09-29

## 2023-10-09 ENCOUNTER — APPOINTMENT (OUTPATIENT)
Dept: ORTHOPEDIC SURGERY | Facility: CLINIC | Age: 78
End: 2023-10-09
Payer: MEDICARE

## 2023-10-09 ENCOUNTER — RX RENEWAL (OUTPATIENT)
Age: 78
End: 2023-10-09

## 2023-10-09 VITALS
HEIGHT: 62 IN | OXYGEN SATURATION: 96 % | TEMPERATURE: 97.8 F | HEART RATE: 66 BPM | DIASTOLIC BLOOD PRESSURE: 84 MMHG | BODY MASS INDEX: 17.3 KG/M2 | SYSTOLIC BLOOD PRESSURE: 167 MMHG | WEIGHT: 94 LBS

## 2023-10-09 DIAGNOSIS — M54.50 LOW BACK PAIN, UNSPECIFIED: ICD-10-CM

## 2023-10-09 DIAGNOSIS — G89.29 LOW BACK PAIN, UNSPECIFIED: ICD-10-CM

## 2023-10-09 PROCEDURE — 99213 OFFICE O/P EST LOW 20 MIN: CPT

## 2023-10-09 RX ORDER — SULFAMETHOXAZOLE AND TRIMETHOPRIM 800; 160 MG/1; MG/1
800-160 TABLET ORAL TWICE DAILY
Qty: 10 | Refills: 0 | Status: COMPLETED | COMMUNITY
Start: 2023-08-16 | End: 2023-10-09

## 2023-10-10 PROBLEM — M54.50 CHRONIC BILATERAL LOW BACK PAIN, UNSPECIFIED WHETHER SCIATICA PRESENT: Status: ACTIVE | Noted: 2023-02-10

## 2023-10-17 ENCOUNTER — APPOINTMENT (OUTPATIENT)
Dept: INTERNAL MEDICINE | Facility: CLINIC | Age: 78
End: 2023-10-17
Payer: MEDICARE

## 2023-10-17 PROCEDURE — 36415 COLL VENOUS BLD VENIPUNCTURE: CPT

## 2023-10-17 PROCEDURE — 96372 THER/PROPH/DIAG INJ SC/IM: CPT

## 2023-10-24 LAB
25(OH)D3 SERPL-MCNC: 32.4 NG/ML
ALBUMIN SERPL ELPH-MCNC: 4.7 G/DL
ALP BLD-CCNC: 90 U/L
ALT SERPL-CCNC: 23 U/L
ANION GAP SERPL CALC-SCNC: 11 MMOL/L
AST SERPL-CCNC: 17 U/L
BASOPHILS # BLD AUTO: 0.05 K/UL
BASOPHILS NFR BLD AUTO: 0.9 %
BILIRUB SERPL-MCNC: 0.9 MG/DL
BUN SERPL-MCNC: 15 MG/DL
CALCIUM SERPL-MCNC: 10.3 MG/DL
CHLORIDE SERPL-SCNC: 100 MMOL/L
CHOLEST SERPL-MCNC: 201 MG/DL
CO2 SERPL-SCNC: 28 MMOL/L
CREAT SERPL-MCNC: 0.56 MG/DL
EGFR: 94 ML/MIN/1.73M2
EOSINOPHIL # BLD AUTO: 0.22 K/UL
EOSINOPHIL NFR BLD AUTO: 3.8 %
ESTIMATED AVERAGE GLUCOSE: 114 MG/DL
FOLATE SERPL-MCNC: 12.5 NG/ML
GLUCOSE SERPL-MCNC: 94 MG/DL
HBA1C MFR BLD HPLC: 5.6 %
HCT VFR BLD CALC: 46.7 %
HDLC SERPL-MCNC: 86 MG/DL
HGB BLD-MCNC: 14.6 G/DL
IMM GRANULOCYTES NFR BLD AUTO: 0.2 %
LDLC SERPL CALC-MCNC: 103 MG/DL
LYMPHOCYTES # BLD AUTO: 1.26 K/UL
LYMPHOCYTES NFR BLD AUTO: 22 %
MAN DIFF?: NORMAL
MCHC RBC-ENTMCNC: 30.9 PG
MCHC RBC-ENTMCNC: 31.3 GM/DL
MCV RBC AUTO: 98.9 FL
MONOCYTES # BLD AUTO: 0.49 K/UL
MONOCYTES NFR BLD AUTO: 8.6 %
NEUTROPHILS # BLD AUTO: 3.7 K/UL
NEUTROPHILS NFR BLD AUTO: 64.5 %
NONHDLC SERPL-MCNC: 115 MG/DL
PLATELET # BLD AUTO: 305 K/UL
POTASSIUM SERPL-SCNC: 4.2 MMOL/L
PROT SERPL-MCNC: 7.1 G/DL
RBC # BLD: 4.72 M/UL
RBC # FLD: 12.3 %
SODIUM SERPL-SCNC: 138 MMOL/L
TRIGL SERPL-MCNC: 65 MG/DL
TSH SERPL-ACNC: 2 UIU/ML
VIT B12 SERPL-MCNC: 710 PG/ML
WBC # FLD AUTO: 5.73 K/UL

## 2023-11-06 ENCOUNTER — APPOINTMENT (OUTPATIENT)
Dept: INTERNAL MEDICINE | Facility: CLINIC | Age: 78
End: 2023-11-06
Payer: MEDICARE

## 2023-11-06 ENCOUNTER — RX RENEWAL (OUTPATIENT)
Age: 78
End: 2023-11-06

## 2023-11-06 ENCOUNTER — NON-APPOINTMENT (OUTPATIENT)
Age: 78
End: 2023-11-06

## 2023-11-06 VITALS
HEIGHT: 62 IN | HEART RATE: 71 BPM | DIASTOLIC BLOOD PRESSURE: 95 MMHG | BODY MASS INDEX: 17.3 KG/M2 | OXYGEN SATURATION: 100 % | TEMPERATURE: 97.8 F | SYSTOLIC BLOOD PRESSURE: 169 MMHG | WEIGHT: 94 LBS

## 2023-11-06 VITALS — DIASTOLIC BLOOD PRESSURE: 78 MMHG | SYSTOLIC BLOOD PRESSURE: 138 MMHG

## 2023-11-06 DIAGNOSIS — Z83.49 FAMILY HISTORY OF OTHER ENDOCRINE, NUTRITIONAL AND METABOLIC DISEASES: ICD-10-CM

## 2023-11-06 DIAGNOSIS — Z00.00 ENCOUNTER FOR GENERAL ADULT MEDICAL EXAMINATION W/OUT ABNORMAL FINDINGS: ICD-10-CM

## 2023-11-06 DIAGNOSIS — F41.9 ANXIETY DISORDER, UNSPECIFIED: ICD-10-CM

## 2023-11-06 DIAGNOSIS — Z80.9 FAMILY HISTORY OF MALIGNANT NEOPLASM, UNSPECIFIED: ICD-10-CM

## 2023-11-06 DIAGNOSIS — Z23 ENCOUNTER FOR IMMUNIZATION: ICD-10-CM

## 2023-11-06 DIAGNOSIS — Z80.1 FAMILY HISTORY OF MALIGNANT NEOPLASM OF TRACHEA, BRONCHUS AND LUNG: ICD-10-CM

## 2023-11-06 DIAGNOSIS — G47.00 INSOMNIA, UNSPECIFIED: ICD-10-CM

## 2023-11-06 DIAGNOSIS — Z82.5 FAMILY HISTORY OF ASTHMA AND OTHER CHRONIC LOWER RESPIRATORY DISEASES: ICD-10-CM

## 2023-11-06 PROCEDURE — G0439: CPT

## 2023-11-06 PROCEDURE — 93000 ELECTROCARDIOGRAM COMPLETE: CPT

## 2023-11-06 RX ORDER — FLUCONAZOLE 150 MG/1
150 TABLET ORAL
Qty: 2 | Refills: 1 | Status: COMPLETED | COMMUNITY
Start: 2023-08-16 | End: 2023-11-06

## 2023-11-06 RX ORDER — ESTRADIOL 0.1 MG/G
0.1 CREAM VAGINAL
Qty: 1 | Refills: 0 | Status: COMPLETED | COMMUNITY
Start: 2023-08-08 | End: 2023-11-06

## 2023-11-07 DIAGNOSIS — L70.9 ACNE, UNSPECIFIED: ICD-10-CM

## 2023-11-07 LAB
APPEARANCE: CLEAR
BACTERIA: NEGATIVE /HPF
BILIRUBIN URINE: NEGATIVE
BLOOD URINE: NEGATIVE
CAST: 0 /LPF
COLOR: YELLOW
EPITHELIAL CELLS: 0 /HPF
GLUCOSE QUALITATIVE U: NEGATIVE MG/DL
KETONES URINE: NEGATIVE MG/DL
LEUKOCYTE ESTERASE URINE: NEGATIVE
MICROSCOPIC-UA: NORMAL
NITRITE URINE: NEGATIVE
PH URINE: 7
PROTEIN URINE: NEGATIVE MG/DL
RED BLOOD CELLS URINE: 1 /HPF
SPECIFIC GRAVITY URINE: 1.01
UROBILINOGEN URINE: 0.2 MG/DL
WHITE BLOOD CELLS URINE: 0 /HPF

## 2023-11-08 ENCOUNTER — APPOINTMENT (OUTPATIENT)
Dept: UROLOGY | Facility: CLINIC | Age: 78
End: 2023-11-08

## 2023-11-14 ENCOUNTER — APPOINTMENT (OUTPATIENT)
Dept: INTERNAL MEDICINE | Facility: CLINIC | Age: 78
End: 2023-11-14
Payer: MEDICARE

## 2023-11-14 ENCOUNTER — APPOINTMENT (OUTPATIENT)
Dept: INTERNAL MEDICINE | Facility: CLINIC | Age: 78
End: 2023-11-14

## 2023-11-14 VITALS — DIASTOLIC BLOOD PRESSURE: 77 MMHG | SYSTOLIC BLOOD PRESSURE: 137 MMHG

## 2023-11-14 PROCEDURE — 99213 OFFICE O/P EST LOW 20 MIN: CPT | Mod: 25

## 2023-11-14 PROCEDURE — 96372 THER/PROPH/DIAG INJ SC/IM: CPT

## 2023-11-14 RX ADMIN — Medication 0 MCG/ML: at 00:00

## 2023-11-20 ENCOUNTER — APPOINTMENT (OUTPATIENT)
Dept: INTERNAL MEDICINE | Facility: CLINIC | Age: 78
End: 2023-11-20
Payer: MEDICARE

## 2023-11-20 PROCEDURE — 99442: CPT

## 2023-12-05 ENCOUNTER — APPOINTMENT (OUTPATIENT)
Dept: INTERNAL MEDICINE | Facility: CLINIC | Age: 78
End: 2023-12-05

## 2023-12-05 ENCOUNTER — APPOINTMENT (OUTPATIENT)
Dept: INTERNAL MEDICINE | Facility: CLINIC | Age: 78
End: 2023-12-05
Payer: MEDICARE

## 2023-12-05 VITALS
RESPIRATION RATE: 16 BRPM | DIASTOLIC BLOOD PRESSURE: 79 MMHG | HEIGHT: 62 IN | OXYGEN SATURATION: 97 % | SYSTOLIC BLOOD PRESSURE: 168 MMHG | BODY MASS INDEX: 16.93 KG/M2 | WEIGHT: 92 LBS | HEART RATE: 64 BPM

## 2023-12-05 VITALS — SYSTOLIC BLOOD PRESSURE: 117 MMHG | DIASTOLIC BLOOD PRESSURE: 71 MMHG

## 2023-12-05 PROCEDURE — 96372 THER/PROPH/DIAG INJ SC/IM: CPT

## 2023-12-05 PROCEDURE — 99213 OFFICE O/P EST LOW 20 MIN: CPT | Mod: 25

## 2024-01-10 ENCOUNTER — APPOINTMENT (OUTPATIENT)
Dept: ORTHOPEDIC SURGERY | Facility: CLINIC | Age: 79
End: 2024-01-10
Payer: MEDICARE

## 2024-01-10 ENCOUNTER — RX RENEWAL (OUTPATIENT)
Age: 79
End: 2024-01-10

## 2024-01-10 DIAGNOSIS — M48.061 SPINAL STENOSIS, LUMBAR REGION WITHOUT NEUROGENIC CLAUDICATION: ICD-10-CM

## 2024-01-10 PROCEDURE — 99213 OFFICE O/P EST LOW 20 MIN: CPT

## 2024-01-10 PROCEDURE — 72083 X-RAY EXAM ENTIRE SPI 4/5 VW: CPT

## 2024-01-11 ENCOUNTER — APPOINTMENT (OUTPATIENT)
Dept: ENDOCRINOLOGY | Facility: CLINIC | Age: 79
End: 2024-01-11
Payer: MEDICARE

## 2024-01-18 ENCOUNTER — APPOINTMENT (OUTPATIENT)
Dept: INTERNAL MEDICINE | Facility: CLINIC | Age: 79
End: 2024-01-18
Payer: MEDICARE

## 2024-01-18 VITALS — DIASTOLIC BLOOD PRESSURE: 63 MMHG | SYSTOLIC BLOOD PRESSURE: 125 MMHG

## 2024-01-18 VITALS
WEIGHT: 95 LBS | DIASTOLIC BLOOD PRESSURE: 71 MMHG | HEIGHT: 62 IN | SYSTOLIC BLOOD PRESSURE: 163 MMHG | BODY MASS INDEX: 17.48 KG/M2 | HEART RATE: 99 BPM | OXYGEN SATURATION: 94 %

## 2024-01-18 DIAGNOSIS — K58.9 IRRITABLE BOWEL SYNDROME W/OUT DIARRHEA: ICD-10-CM

## 2024-01-18 PROCEDURE — 96372 THER/PROPH/DIAG INJ SC/IM: CPT

## 2024-01-18 PROCEDURE — 99213 OFFICE O/P EST LOW 20 MIN: CPT | Mod: 25

## 2024-01-18 RX ORDER — CYANOCOBALAMIN 1000 UG/ML
1000 INJECTION INTRAMUSCULAR; SUBCUTANEOUS
Qty: 3 | Refills: 3 | Status: COMPLETED | COMMUNITY
Start: 2023-05-19 | End: 2024-01-18

## 2024-01-18 RX ORDER — CHROMIUM 200 MCG
TABLET ORAL DAILY
Refills: 0 | Status: ACTIVE | COMMUNITY

## 2024-01-18 RX ORDER — CYANOCOBALAMIN 1000 UG/ML
1000 INJECTION INTRAMUSCULAR; SUBCUTANEOUS
Qty: 0 | Refills: 0 | Status: COMPLETED | OUTPATIENT
Start: 2024-01-18

## 2024-01-18 RX ADMIN — CYANOCOBALAMIN 0 MCG/ML: 1000 INJECTION INTRAMUSCULAR; SUBCUTANEOUS at 00:00

## 2024-01-18 NOTE — PHYSICAL EXAM
[No Acute Distress] : no acute distress [No Respiratory Distress] : no respiratory distress  [No Edema] : there was no peripheral edema [de-identified] : b/l hand erythema [de-identified] : anxious affect

## 2024-01-23 DIAGNOSIS — Z12.39 ENCOUNTER FOR OTHER SCREENING FOR MALIGNANT NEOPLASM OF BREAST: ICD-10-CM

## 2024-01-24 ENCOUNTER — APPOINTMENT (OUTPATIENT)
Dept: OTOLARYNGOLOGY | Facility: CLINIC | Age: 79
End: 2024-01-24

## 2024-01-26 NOTE — HISTORY OF PRESENT ILLNESS
[de-identified] : Follow up 1/10/24: Patient presents for follow up. No significant change in back pain from last visit. No new neurologic symptoms. Has not yet started evenity.   Follow up 10/9/23: No significant back pain. Reports numbness in the feet chronic. does have intermittent radicular symtpoms. Diagnosed wtih severe osteoporosis, recommended for evenity by dr. franklin, has been unable to start due to high copays.

## 2024-01-26 NOTE — PHYSICAL EXAM
[de-identified] : Physical Exam:  General: patient is well developed, well nourished, in no acute distress, alert and oriented x 3.  Mood and affect: normal  Respiratory: no respiratory distress noted  Skin: no scars over spine, skin intact, no erythema, increased warmth  Alignment:The spine is well compensated in the coronal and sagittal plane.  Gait: The patient is able to toe walk and heel walk without difficulty. The patient is able to tandem gait without difficutly.  Palpation: no tenderness to palpation spine or paraspinal region  Range of motion: Lumbar spine ROM is restricted  Neurologic Exam: Motor: Manual Muscle testing in the lower extremities is 5 out of 5 in all muscle groups. There is no evidence of muscular atrophy in the lower extremities. Sensory: Sensation to light touch is grossly intact in the lower extremities  Reflexes: DTR are present and symmetric throughout, no clonus, plantar responses are flexor  Hip Exam: No pain with internal or external rotation of hips bilaterally  Special tests: Straight leg raise test negative. Cross straight leg test negative. GEORGIE test negative  Vascular: Examination of the peripheral vascular system demonstrates no evidence of congestion or edema. no evidence of lymphedema bilateral lower extremities, pulses are present and symmetric in both lower extremities. [de-identified] : XR 1/10/24 (my read): scoliosis, lumbar flatback from severe degenerative disc disease l3/4, L4/5, L5/S1. Grade 1/2 spondylolisthesis of L4/5. significant pelvic compensation with moderate forward SVA, no significant change from 4/2023 imaging  Xray 4/3/2023: my read: Xray 4/3/2023: my read: scoliosis, lumbar flatback from severe degenerative disc disease l3/4, L4/5, L5/S1. Grade 1/2 spondylolisthesis of L4/5. significant pelvic compensation with moderate forward SVA.  Lumbar MRI 3/29/23: T12/L1: mild to moderate stenosis; L1/2: moderate to severe stenosis; L2/3: moderate to severe canal stenosis; L3/4:severe canal stenosis; L4/5 severe bilateral lateral recess stenosis, moderate to severe canal stenosis; L5/s1: moderate lateral recess stenosis, moderate foarminal stenosis; right severe foraminal stenosis from scoliosis and degenerative changes l2/3, l3/4, l4/5.

## 2024-01-26 NOTE — DISCUSSION/SUMMARY
[de-identified] : Severe mutilevel stenosis; numbness in lower extremities may be related to stenosis vs. polyneuropoathy from emg vs. both. scoliosis and severe lumbar degenerative changes, Cervical disc disease, cervical foraminal stenosis  I discussed my findings with the patient and her . Given the extent of her low back pathology and relative mild symptoms at this point, it seems prudent to build up her bone density with an anabolic agent in the meantime as her surgery would be extensive with higher than average risk of mechanical complications due to osteoporosis. Discussed evenity copay assist card to offset her out of pocket cost. Patient given contact number to reach out to . She will follow up with me in 3 months, certainly sooner if there are any issues. All questions answered.

## 2024-02-07 ENCOUNTER — APPOINTMENT (OUTPATIENT)
Dept: OTOLARYNGOLOGY | Facility: CLINIC | Age: 79
End: 2024-02-07
Payer: MEDICARE

## 2024-02-07 VITALS
BODY MASS INDEX: 17.48 KG/M2 | HEIGHT: 62 IN | HEART RATE: 72 BPM | SYSTOLIC BLOOD PRESSURE: 152 MMHG | OXYGEN SATURATION: 97 % | WEIGHT: 95 LBS | DIASTOLIC BLOOD PRESSURE: 75 MMHG

## 2024-02-07 DIAGNOSIS — Z78.9 OTHER SPECIFIED HEALTH STATUS: ICD-10-CM

## 2024-02-07 DIAGNOSIS — Z87.898 PERSONAL HISTORY OF OTHER SPECIFIED CONDITIONS: ICD-10-CM

## 2024-02-07 DIAGNOSIS — Z86.79 PERSONAL HISTORY OF OTHER DISEASES OF THE CIRCULATORY SYSTEM: ICD-10-CM

## 2024-02-07 PROCEDURE — 99203 OFFICE O/P NEW LOW 30 MIN: CPT

## 2024-02-13 ENCOUNTER — RX RENEWAL (OUTPATIENT)
Age: 79
End: 2024-02-13

## 2024-02-22 ENCOUNTER — APPOINTMENT (OUTPATIENT)
Dept: INTERNAL MEDICINE | Facility: CLINIC | Age: 79
End: 2024-02-22
Payer: MEDICARE

## 2024-02-22 ENCOUNTER — MED ADMIN CHARGE (OUTPATIENT)
Age: 79
End: 2024-02-22

## 2024-02-22 VITALS
DIASTOLIC BLOOD PRESSURE: 77 MMHG | SYSTOLIC BLOOD PRESSURE: 143 MMHG | WEIGHT: 95 LBS | RESPIRATION RATE: 17 BRPM | HEIGHT: 62 IN | HEART RATE: 61 BPM | TEMPERATURE: 97.3 F | OXYGEN SATURATION: 97 % | BODY MASS INDEX: 17.48 KG/M2

## 2024-02-22 VITALS — SYSTOLIC BLOOD PRESSURE: 123 MMHG | DIASTOLIC BLOOD PRESSURE: 77 MMHG

## 2024-02-22 DIAGNOSIS — Z91.09 OTHER ALLERGY STATUS, OTHER THAN TO DRUGS AND BIOLOGICAL SUBSTANCES: ICD-10-CM

## 2024-02-22 PROCEDURE — G2211 COMPLEX E/M VISIT ADD ON: CPT

## 2024-02-22 PROCEDURE — 99214 OFFICE O/P EST MOD 30 MIN: CPT

## 2024-02-22 RX ORDER — TRIAMCINOLONE ACETONIDE 1 MG/G
0.1 CREAM TOPICAL TWICE DAILY
Qty: 1 | Refills: 3 | Status: COMPLETED | COMMUNITY
Start: 2023-07-17 | End: 2024-02-22

## 2024-02-22 RX ORDER — CLOBETASOL PROPIONATE 0.5 MG/G
0.05 CREAM TOPICAL TWICE DAILY
Qty: 1 | Refills: 0 | Status: COMPLETED | COMMUNITY
Start: 2023-07-17 | End: 2024-02-22

## 2024-02-22 RX ORDER — KETOCONAZOLE 20 MG/G
2 CREAM TOPICAL
Qty: 1 | Refills: 0 | Status: COMPLETED | COMMUNITY
Start: 2023-11-06 | End: 2024-02-22

## 2024-02-22 RX ORDER — DIPHENHYDRAMINE HCL 25 MG/1
CAPSULE, LIQUID FILLED ORAL
Qty: 1 | Refills: 0 | Status: ACTIVE | COMMUNITY
Start: 2024-02-22 | End: 1900-01-01

## 2024-02-22 RX ORDER — CYANOCOBALAMIN 1000 UG/ML
1000 INJECTION INTRAMUSCULAR; SUBCUTANEOUS
Qty: 0 | Refills: 0 | Status: COMPLETED | OUTPATIENT
Start: 2024-02-22

## 2024-02-22 RX ADMIN — CYANOCOBALAMIN 0 MCG/ML: 1000 INJECTION INTRAMUSCULAR; SUBCUTANEOUS at 00:00

## 2024-02-22 NOTE — PHYSICAL EXAM
[No Acute Distress] : no acute distress [Well-Appearing] : well-appearing [Normal Sclera/Conjunctiva] : normal sclera/conjunctiva [Normal] : normal rate, regular rhythm, normal S1 and S2 and no murmur heard [de-identified] : slightly hypertrophied nasal turbinates.

## 2024-02-22 NOTE — HISTORY OF PRESENT ILLNESS
[de-identified] :  Pt is a 78 year old F with PMHx of b12 deficiency, HTN who presents to the office today for follow up visit.   Dry hands: - pt reports having very dry hands, skin cracking with the cold weather. - Uses clobetasol cream 1-2 times a day to help and topical mupirocin.  - She reports being UTD with derm.    BP: - Pt reports that she does not check her BP daily because she gets nervous about the reading.  - never seen cardiologist - systolic at home approx 150.   cough:  -unchanged - Pt endorses post-nasal drip in the morning that causes throat clearing. - however, she does endorse throat clearing throughout the day w/o post nasal drip.  -pt is taking zyrtec daily.  [FreeTextEntry1] : follow up visit

## 2024-02-26 NOTE — HISTORY OF PRESENT ILLNESS
[de-identified] : throat irritation + clogged ears. right ear wax cleaning.  Hx throat ca in family concerned pt

## 2024-02-26 NOTE — CONSULT LETTER
[Dear  ___] : Dear  [unfilled], [Consult Letter:] : I had the pleasure of evaluating your patient, [unfilled]. [Consult Closing:] : Thank you very much for allowing me to participate in the care of this patient.  If you have any questions, please do not hesitate to contact me. [Please see my note below.] : Please see my note below. [FreeTextEntry3] : Torsten Vieira MD, FACS Professor of Otolaryngology, [employer] at [facility] Director, [facility], Department of Otolaryngology, [facility] , Head & Neck Service Line, [facility]  [Sincerely,] : Sincerely,

## 2024-02-26 NOTE — REASON FOR VISIT
[Initial Consultation] : an initial consultation for [FreeTextEntry2] : throat irritation + clogged ears.

## 2024-03-20 ENCOUNTER — MED ADMIN CHARGE (OUTPATIENT)
Age: 79
End: 2024-03-20

## 2024-03-20 ENCOUNTER — APPOINTMENT (OUTPATIENT)
Dept: INTERNAL MEDICINE | Facility: CLINIC | Age: 79
End: 2024-03-20
Payer: MEDICARE

## 2024-03-20 VITALS — DIASTOLIC BLOOD PRESSURE: 75 MMHG | SYSTOLIC BLOOD PRESSURE: 120 MMHG

## 2024-03-20 VITALS
OXYGEN SATURATION: 96 % | BODY MASS INDEX: 17.85 KG/M2 | SYSTOLIC BLOOD PRESSURE: 158 MMHG | WEIGHT: 97 LBS | HEART RATE: 61 BPM | RESPIRATION RATE: 16 BRPM | HEIGHT: 62 IN | DIASTOLIC BLOOD PRESSURE: 71 MMHG

## 2024-03-20 DIAGNOSIS — L30.9 DERMATITIS, UNSPECIFIED: ICD-10-CM

## 2024-03-20 PROCEDURE — 36415 COLL VENOUS BLD VENIPUNCTURE: CPT

## 2024-03-20 PROCEDURE — 96372 THER/PROPH/DIAG INJ SC/IM: CPT

## 2024-03-20 PROCEDURE — 99214 OFFICE O/P EST MOD 30 MIN: CPT | Mod: 25

## 2024-03-20 RX ORDER — CYANOCOBALAMIN 1000 UG/ML
1000 INJECTION INTRAMUSCULAR; SUBCUTANEOUS
Qty: 0 | Refills: 0 | Status: COMPLETED | OUTPATIENT
Start: 2024-03-20

## 2024-03-20 RX ORDER — TRIAMCINOLONE ACETONIDE 0.25 MG/G
0.03 OINTMENT TOPICAL
Qty: 1 | Refills: 0 | Status: ACTIVE | COMMUNITY
Start: 2024-02-22 | End: 1900-01-01

## 2024-03-20 RX ORDER — MUPIROCIN 20 MG/G
2 OINTMENT TOPICAL 3 TIMES DAILY
Qty: 1 | Refills: 2 | Status: ACTIVE | COMMUNITY
Start: 2023-07-17 | End: 1900-01-01

## 2024-03-20 RX ADMIN — CYANOCOBALAMIN 0 MCG/ML: 1000 INJECTION, SOLUTION INTRAMUSCULAR at 00:00

## 2024-03-20 NOTE — END OF VISIT
[FreeTextEntry3] : I, Dr. Rodriguez, personally performed the evaluation and management (E/M) services for this established patient who presents today with (a) new problem(s)/exacerbation of (an) existing condition(s).  That E/M includes conducting the examination, assessing all new/exacerbated conditions, and establishing a new plan of care.  Today, my PA, Machelle Monge, was here to observe my evaluation and management services for this new problem/exacerbated condition to be followed going forward.

## 2024-03-20 NOTE — PHYSICAL EXAM
[No Acute Distress] : no acute distress [Normal] : normal rate, regular rhythm, normal S1 and S2 and no murmur heard [No Edema] : there was no peripheral edema [Normal Insight/Judgement] : insight and judgment were intact [de-identified] : b/l dry cracked hands

## 2024-03-20 NOTE — HISTORY OF PRESENT ILLNESS
[FreeTextEntry1] :  f/u B12 def and HTN [de-identified] : Pt is a 77 y/o F with PMHx of HTN, B12 who presents to the office today for f/u B12 def and HTN  Dr. Black - [facility] [address] - hands still very dry and pt is concerned - she has been using clobetosol at home  OP: - had Boniva in the remote past - recommended to have evenity but unaffordable- > $400 a month - pt reached out to drug companies and they were unable to help her  HTN: - in the morning pt is finding the morning elevated BP - she has appointment with cardio in a few weeks

## 2024-03-21 LAB
ANION GAP SERPL CALC-SCNC: 11 MMOL/L
BUN SERPL-MCNC: 13 MG/DL
CALCIUM SERPL-MCNC: 10.2 MG/DL
CHLORIDE SERPL-SCNC: 100 MMOL/L
CO2 SERPL-SCNC: 26 MMOL/L
CREAT SERPL-MCNC: 0.56 MG/DL
EGFR: 94 ML/MIN/1.73M2
FOLATE SERPL-MCNC: 8.4 NG/ML
GLUCOSE SERPL-MCNC: 80 MG/DL
POTASSIUM SERPL-SCNC: 5.1 MMOL/L
SODIUM SERPL-SCNC: 137 MMOL/L
VIT B12 SERPL-MCNC: 817 PG/ML

## 2024-04-11 ENCOUNTER — APPOINTMENT (OUTPATIENT)
Dept: HEART AND VASCULAR | Facility: CLINIC | Age: 79
End: 2024-04-11
Payer: MEDICARE

## 2024-04-11 VITALS
BODY MASS INDEX: 18.03 KG/M2 | OXYGEN SATURATION: 96 % | SYSTOLIC BLOOD PRESSURE: 172 MMHG | WEIGHT: 98 LBS | DIASTOLIC BLOOD PRESSURE: 74 MMHG | TEMPERATURE: 97.2 F | HEIGHT: 62 IN | HEART RATE: 60 BPM

## 2024-04-11 VITALS — DIASTOLIC BLOOD PRESSURE: 80 MMHG | SYSTOLIC BLOOD PRESSURE: 143 MMHG

## 2024-04-11 PROCEDURE — 99215 OFFICE O/P EST HI 40 MIN: CPT | Mod: 25

## 2024-04-11 PROCEDURE — 99205 OFFICE O/P NEW HI 60 MIN: CPT | Mod: 25

## 2024-04-11 PROCEDURE — 93000 ELECTROCARDIOGRAM COMPLETE: CPT

## 2024-04-11 RX ORDER — LOSARTAN POTASSIUM 50 MG/1
50 TABLET, FILM COATED ORAL DAILY
Qty: 1 | Refills: 1 | Status: DISCONTINUED | COMMUNITY
End: 2024-04-11

## 2024-04-11 NOTE — HISTORY OF PRESENT ILLNESS
[FreeTextEntry1] :  77 y/o female with raynaud's, htn, b12 defieciency, family h/o early cvd who presents for initial evaluation today.   no cp, sob, syncope, lh, palpitations, edema, orthopnea, pnd  no pregnancy complications walks for exercise h/o anxiety - not seeing therapist stress mod - high sleep 7 hours   PMH/PSH: anxiety b12 deficiency spinal stenosis htn ibs spinal stenosis polyneuropathy s/p appy s/p tonsillectomy carpal tunnel surgery raynauds  ALL: nkda  MEDS: alprazolam prn losartan 50 mg qd vit D ambien 10 mg qd zyrtec   SH: no tobacco social etoh no drugs 2 children - healthy from [place]  retired former [employer]   SH: mother - MI 60's,  71 father - ppm, parkinson's,  80's brother -  lung ca 64

## 2024-04-11 NOTE — DISCUSSION/SUMMARY
[Patient] : the patient [___ Week(s)] : in [unfilled] week(s) [EKG obtained to assist in diagnosis and management of assessed problem(s)] : EKG obtained to assist in diagnosis and management of assessed problem(s) [FreeTextEntry1] :  79 y/o female with raynaud's, htn, b12 defieciency, family h/o early cvd who presents for initial evaluation today.   -ordered ekg today - nsr, normal intervals, nsr -dc losartan given cough - change norvasc 5 mg qd -ordered calcium score  -ordered echo for htn -labs 2024 reviewed -counseled on cvd risk factors -f/up 3 weeks for htn  I have spent 60 minutes reviewing labs, records, tests and discussed htn and cvd risk factors

## 2024-04-11 NOTE — PHYSICAL EXAM
[Well Developed] : well developed [Well Nourished] : well nourished [No Acute Distress] : no acute distress [Normal Conjunctiva] : normal conjunctiva [Normal Venous Pressure] : normal venous pressure [No Carotid Bruit] : no carotid bruit [Normal S1, S2] : normal S1, S2 [No Rub] : no rub [No Gallop] : no gallop [Clear Lung Fields] : clear lung fields [Good Air Entry] : good air entry [No Respiratory Distress] : no respiratory distress  [Soft] : abdomen soft [Non Tender] : non-tender [No Masses/organomegaly] : no masses/organomegaly [Normal Bowel Sounds] : normal bowel sounds [Normal Gait] : normal gait [No Edema] : no edema [No Cyanosis] : no cyanosis [No Clubbing] : no clubbing [No Varicosities] : no varicosities [No Rash] : no rash [No Skin Lesions] : no skin lesions [Moves all extremities] : moves all extremities [No Focal Deficits] : no focal deficits [Normal Speech] : normal speech [Alert and Oriented] : alert and oriented [Normal memory] : normal memory [de-identified] : 2/6 hernan mina

## 2024-04-16 ENCOUNTER — RX RENEWAL (OUTPATIENT)
Age: 79
End: 2024-04-16

## 2024-04-18 ENCOUNTER — APPOINTMENT (OUTPATIENT)
Dept: INTERNAL MEDICINE | Facility: CLINIC | Age: 79
End: 2024-04-18
Payer: MEDICARE

## 2024-04-18 VITALS
SYSTOLIC BLOOD PRESSURE: 145 MMHG | RESPIRATION RATE: 16 BRPM | OXYGEN SATURATION: 98 % | DIASTOLIC BLOOD PRESSURE: 65 MMHG | HEIGHT: 62 IN | TEMPERATURE: 97.4 F | HEART RATE: 61 BPM

## 2024-04-18 PROCEDURE — 96372 THER/PROPH/DIAG INJ SC/IM: CPT

## 2024-04-18 PROCEDURE — 99213 OFFICE O/P EST LOW 20 MIN: CPT | Mod: 25

## 2024-04-18 RX ORDER — CYANOCOBALAMIN 1000 UG/ML
1000 INJECTION INTRAMUSCULAR; SUBCUTANEOUS
Qty: 0 | Refills: 0 | Status: COMPLETED | OUTPATIENT
Start: 2024-04-18

## 2024-04-18 RX ADMIN — CYANOCOBALAMIN 1 MCG/ML: 1000 INJECTION INTRAMUSCULAR; SUBCUTANEOUS at 00:00

## 2024-04-18 NOTE — HISTORY OF PRESENT ILLNESS
[FreeTextEntry1] : B12 in, HTN follow up [de-identified] : HTN - changed losartan to amlodipine 5mg b/c of persistent cough - today BP is elevated b/c of social stressors at home  right lower eye - in the past has used polytrim ointment from the ophtho which has helped.

## 2024-04-18 NOTE — PHYSICAL EXAM
[No Acute Distress] : no acute distress [Normal Sclera/Conjunctiva] : normal sclera/conjunctiva [EOMI] : extraocular movements intact [No Respiratory Distress] : no respiratory distress  [Normal] : affect was normal and insight and judgment were intact [de-identified] : right lower lateral eyelash boarder 2mm white dot

## 2024-04-24 ENCOUNTER — APPOINTMENT (OUTPATIENT)
Dept: ORTHOPEDIC SURGERY | Facility: CLINIC | Age: 79
End: 2024-04-24

## 2024-05-01 ENCOUNTER — APPOINTMENT (OUTPATIENT)
Dept: OTOLARYNGOLOGY | Facility: CLINIC | Age: 79
End: 2024-05-01
Payer: MEDICARE

## 2024-05-01 ENCOUNTER — APPOINTMENT (OUTPATIENT)
Dept: HEART AND VASCULAR | Facility: CLINIC | Age: 79
End: 2024-05-01

## 2024-05-01 VITALS
TEMPERATURE: 98 F | HEART RATE: 67 BPM | OXYGEN SATURATION: 100 % | DIASTOLIC BLOOD PRESSURE: 77 MMHG | SYSTOLIC BLOOD PRESSURE: 144 MMHG | BODY MASS INDEX: 17.48 KG/M2 | HEIGHT: 62 IN | WEIGHT: 95 LBS

## 2024-05-01 PROCEDURE — 99213 OFFICE O/P EST LOW 20 MIN: CPT | Mod: 25

## 2024-05-01 PROCEDURE — 92567 TYMPANOMETRY: CPT

## 2024-05-01 PROCEDURE — 69210 REMOVE IMPACTED EAR WAX UNI: CPT | Mod: LT

## 2024-05-01 PROCEDURE — 92557 COMPREHENSIVE HEARING TEST: CPT

## 2024-05-01 RX ORDER — METHYLPREDNISOLONE 4 MG/1
4 TABLET ORAL
Qty: 1 | Refills: 0 | Status: ACTIVE | COMMUNITY
Start: 2024-05-01 | End: 1900-01-01

## 2024-05-01 NOTE — PHYSICAL EXAM
[Midline] : trachea located in midline position [Normal] : no rashes [de-identified] : hearing loss sudden Left ear  Cerumen removed Lt

## 2024-05-01 NOTE — REASON FOR VISIT
[Subsequent Evaluation] : a subsequent evaluation for [Hearing Loss] : hearing loss [FreeTextEntry2] : hearing loss sudden Left ear

## 2024-05-01 NOTE — HISTORY OF PRESENT ILLNESS
[de-identified] : 77 y/o F presents to the office with C/O R sudden hearing loss and dizziness since 2 days ago. Pt denies pain or discharge. Pt also reports symptoms began with pressure and ringing. Pt complains of spinning sensation when she lays down. She denies tenderness behind ear.  hearing loss sudden Left ear  Cerumen removed Lt

## 2024-05-06 ENCOUNTER — APPOINTMENT (OUTPATIENT)
Dept: ORTHOPEDIC SURGERY | Facility: CLINIC | Age: 79
End: 2024-05-06

## 2024-05-07 ENCOUNTER — APPOINTMENT (OUTPATIENT)
Dept: OTOLARYNGOLOGY | Facility: CLINIC | Age: 79
End: 2024-05-07
Payer: MEDICARE

## 2024-05-07 ENCOUNTER — APPOINTMENT (OUTPATIENT)
Dept: INTERNAL MEDICINE | Facility: CLINIC | Age: 79
End: 2024-05-07

## 2024-05-07 VITALS
OXYGEN SATURATION: 100 % | BODY MASS INDEX: 17.85 KG/M2 | HEIGHT: 62 IN | SYSTOLIC BLOOD PRESSURE: 133 MMHG | DIASTOLIC BLOOD PRESSURE: 78 MMHG | TEMPERATURE: 96.3 F | WEIGHT: 97 LBS | HEART RATE: 63 BPM

## 2024-05-07 PROCEDURE — 99213 OFFICE O/P EST LOW 20 MIN: CPT | Mod: 25

## 2024-05-07 PROCEDURE — 92567 TYMPANOMETRY: CPT

## 2024-05-07 PROCEDURE — 92557 COMPREHENSIVE HEARING TEST: CPT

## 2024-05-07 PROCEDURE — 69801 INCISE INNER EAR: CPT | Mod: LT

## 2024-05-07 NOTE — HISTORY OF PRESENT ILLNESS
[de-identified] : 79 y/o F presents to the office with C/O R sudden hearing loss and dizziness. Hearing test with audio completed.  Steroid inj left ear

## 2024-05-07 NOTE — REASON FOR VISIT
[Subsequent Evaluation] : a subsequent evaluation for [Hearing Loss] : hearing loss [Vertigo] : vertigo [FreeTextEntry2] : persoistent SNHL Left w mild vertigo  Steroid inj

## 2024-05-14 ENCOUNTER — RX RENEWAL (OUTPATIENT)
Age: 79
End: 2024-05-14

## 2024-05-15 ENCOUNTER — APPOINTMENT (OUTPATIENT)
Dept: OPHTHALMOLOGY | Facility: CLINIC | Age: 79
End: 2024-05-15
Payer: MEDICARE

## 2024-05-15 ENCOUNTER — NON-APPOINTMENT (OUTPATIENT)
Age: 79
End: 2024-05-15

## 2024-05-15 PROCEDURE — 92025 CPTRIZED CORNEAL TOPOGRAPHY: CPT

## 2024-05-15 PROCEDURE — 92004 COMPRE OPH EXAM NEW PT 1/>: CPT

## 2024-05-15 PROCEDURE — 92136 OPHTHALMIC BIOMETRY: CPT

## 2024-05-16 ENCOUNTER — APPOINTMENT (OUTPATIENT)
Dept: INTERNAL MEDICINE | Facility: CLINIC | Age: 79
End: 2024-05-16
Payer: MEDICARE

## 2024-05-16 VITALS
RESPIRATION RATE: 17 BRPM | SYSTOLIC BLOOD PRESSURE: 126 MMHG | WEIGHT: 97 LBS | HEIGHT: 62 IN | BODY MASS INDEX: 17.85 KG/M2 | HEART RATE: 63 BPM | OXYGEN SATURATION: 100 % | DIASTOLIC BLOOD PRESSURE: 78 MMHG | TEMPERATURE: 97.4 F

## 2024-05-16 DIAGNOSIS — Z87.898 PERSONAL HISTORY OF OTHER SPECIFIED CONDITIONS: ICD-10-CM

## 2024-05-16 DIAGNOSIS — I10 ESSENTIAL (PRIMARY) HYPERTENSION: ICD-10-CM

## 2024-05-16 DIAGNOSIS — H00.12 CHALAZION RIGHT LOWER EYELID: ICD-10-CM
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
## 2024-05-16 DIAGNOSIS — H91.92 UNSPECIFIED HEARING LOSS, LEFT EAR: ICD-10-CM

## 2024-05-16 DIAGNOSIS — L02.91 CUTANEOUS ABSCESS, UNSPECIFIED: ICD-10-CM

## 2024-05-16 PROCEDURE — 96372 THER/PROPH/DIAG INJ SC/IM: CPT

## 2024-05-16 PROCEDURE — G2211 COMPLEX E/M VISIT ADD ON: CPT

## 2024-05-16 PROCEDURE — 99213 OFFICE O/P EST LOW 20 MIN: CPT | Mod: 25

## 2024-05-16 RX ORDER — CYANOCOBALAMIN 1000 UG/ML
1000 INJECTION INTRAMUSCULAR; SUBCUTANEOUS
Qty: 0 | Refills: 0 | Status: COMPLETED | OUTPATIENT
Start: 2024-05-16

## 2024-05-16 RX ADMIN — CYANOCOBALAMIN 0 MCG/ML: 1000 INJECTION INTRAMUSCULAR; SUBCUTANEOUS at 00:00

## 2024-05-16 NOTE — HISTORY OF PRESENT ILLNESS
[FreeTextEntry1] : follow up [de-identified] : left ear deafness - acute on set about 3 weeks a/w vertigo - Evaluated by ENT tx w/ steroid injection; vertigo improved but continues to have decreased hearing. - started on medrol dose juan - has not noted any improvement in hearing  HTN - cough imroved off losartan - BP improved on amlodipine 5mg daily  cataracts, staged, right --> left - scheduled for 6/17/24

## 2024-05-29 ENCOUNTER — APPOINTMENT (OUTPATIENT)
Dept: OTOLARYNGOLOGY | Facility: CLINIC | Age: 79
End: 2024-05-29
Payer: MEDICARE

## 2024-05-29 ENCOUNTER — APPOINTMENT (OUTPATIENT)
Dept: OTOLARYNGOLOGY | Facility: CLINIC | Age: 79
End: 2024-05-29

## 2024-05-29 ENCOUNTER — APPOINTMENT (OUTPATIENT)
Dept: ENDOCRINOLOGY | Facility: CLINIC | Age: 79
End: 2024-05-29
Payer: MEDICARE

## 2024-05-29 VITALS
HEIGHT: 62 IN | DIASTOLIC BLOOD PRESSURE: 82 MMHG | HEART RATE: 79 BPM | OXYGEN SATURATION: 100 % | WEIGHT: 97 LBS | SYSTOLIC BLOOD PRESSURE: 128 MMHG | TEMPERATURE: 98 F | BODY MASS INDEX: 17.85 KG/M2

## 2024-05-29 VITALS
HEART RATE: 83 BPM | BODY MASS INDEX: 17.74 KG/M2 | SYSTOLIC BLOOD PRESSURE: 132 MMHG | WEIGHT: 97 LBS | DIASTOLIC BLOOD PRESSURE: 69 MMHG

## 2024-05-29 DIAGNOSIS — M81.0 AGE-RELATED OSTEOPOROSIS W/OUT CURRENT PATHOLOGICAL FRACTURE: ICD-10-CM

## 2024-05-29 DIAGNOSIS — H91.8X2 OTHER SPECIFIED HEARING LOSS, LEFT EAR: ICD-10-CM

## 2024-05-29 DIAGNOSIS — R26.89 OTHER ABNORMALITIES OF GAIT AND MOBILITY: ICD-10-CM

## 2024-05-29 PROCEDURE — 99214 OFFICE O/P EST MOD 30 MIN: CPT

## 2024-05-29 PROCEDURE — 99213 OFFICE O/P EST LOW 20 MIN: CPT

## 2024-05-29 PROCEDURE — G2211 COMPLEX E/M VISIT ADD ON: CPT

## 2024-05-29 PROCEDURE — 92557 COMPREHENSIVE HEARING TEST: CPT

## 2024-05-29 RX ORDER — MINOCYCLINE HYDROCHLORIDE 100 MG/1
100 CAPSULE ORAL DAILY
Qty: 30 | Refills: 1 | Status: DISCONTINUED | COMMUNITY
Start: 2023-11-07 | End: 2024-05-29

## 2024-05-29 RX ORDER — NEOMYCIN AND POLYMYXIN B SULFATES AND DEXAMETHASONE 3.5; 10000; 1 MG/G; [IU]/G; MG/G
3.5-10000-0.1 OINTMENT OPHTHALMIC
Qty: 1 | Refills: 0 | Status: DISCONTINUED | COMMUNITY
Start: 2024-04-18 | End: 2024-05-29

## 2024-05-29 NOTE — HISTORY OF PRESENT ILLNESS
[de-identified] : 78 y/o F with right hearing loss presents to the office with C/O clicking on B/L ears. 1X Steroid injection given in left ear previous visit. Hearing test with audio completed. Hearing has improved.  
No

## 2024-06-03 ENCOUNTER — APPOINTMENT (OUTPATIENT)
Dept: INTERNAL MEDICINE | Facility: CLINIC | Age: 79
End: 2024-06-03
Payer: MEDICARE

## 2024-06-03 VITALS
SYSTOLIC BLOOD PRESSURE: 116 MMHG | DIASTOLIC BLOOD PRESSURE: 65 MMHG | HEIGHT: 62 IN | OXYGEN SATURATION: 97 % | HEART RATE: 73 BPM | BODY MASS INDEX: 17.11 KG/M2 | WEIGHT: 93 LBS | TEMPERATURE: 98 F

## 2024-06-03 DIAGNOSIS — E53.8 DEFICIENCY OF OTHER SPECIFIED B GROUP VITAMINS: ICD-10-CM

## 2024-06-03 DIAGNOSIS — Z01.818 ENCOUNTER FOR OTHER PREPROCEDURAL EXAMINATION: ICD-10-CM

## 2024-06-03 DIAGNOSIS — T22.00XA BURN OF UNSPECIFIED DEGREE OF SHOULDER AND UPPER LIMB, EXCEPT WRIST AND HAND, UNSPECIFIED SITE, INITIAL ENCOUNTER: ICD-10-CM

## 2024-06-03 PROCEDURE — 96372 THER/PROPH/DIAG INJ SC/IM: CPT

## 2024-06-03 PROCEDURE — 99213 OFFICE O/P EST LOW 20 MIN: CPT | Mod: 25

## 2024-06-03 RX ORDER — CYANOCOBALAMIN 1000 UG/ML
1000 INJECTION INTRAMUSCULAR; SUBCUTANEOUS
Qty: 0 | Refills: 0 | Status: COMPLETED | OUTPATIENT
Start: 2024-06-03

## 2024-06-03 RX ORDER — SILVER SULFADIAZINE 10 MG/G
1 CREAM TOPICAL TWICE DAILY
Qty: 1 | Refills: 0 | Status: ACTIVE | COMMUNITY
Start: 2024-06-03 | End: 1900-01-01

## 2024-06-03 RX ADMIN — CYANOCOBALAMIN 0 MCG/ML: 1000 INJECTION INTRAMUSCULAR; SUBCUTANEOUS at 00:00

## 2024-06-03 NOTE — PHYSICAL EXAM
[No Acute Distress] : no acute distress [Normal Sclera/Conjunctiva] : normal sclera/conjunctiva [EOMI] : extraocular movements intact [No Edema] : there was no peripheral edema [No CVA Tenderness] : no CVA  tenderness [No Joint Swelling] : no joint swelling [No Rash] : no rash [Normal] : affect was normal and insight and judgment were intact

## 2024-06-04 NOTE — END OF VISIT
[FreeTextEntry3] : I personally performed the service described in the documentation, reviewed the documentation recorded by student APOORVA Gunn in my presence and it accurately and completely records my words and actions.

## 2024-06-04 NOTE — HISTORY OF PRESENT ILLNESS
[No Pertinent Cardiac History] : no history of aortic stenosis, atrial fibrillation, coronary artery disease, recent myocardial infarction, or implantable device/pacemaker [No Pertinent Pulmonary History] : no history of asthma, COPD, sleep apnea, or smoking [No Adverse Anesthesia Reaction] : no adverse anesthesia reaction in self or family member [(Patient denies any chest pain, claudication, dyspnea on exertion, orthopnea, palpitations or syncope)] : Patient denies any chest pain, claudication, dyspnea on exertion, orthopnea, palpitations or syncope [Excellent (>10 METs)] : Excellent (>10 METs) [Spouse] : spouse [Chronic Anticoagulation] : no chronic anticoagulation [Chronic Kidney Disease] : no chronic kidney disease [Diabetes] : no diabetes [FreeTextEntry1] : b/l cataracts extraction [FreeTextEntry2] : 06/17/24 [FreeTextEntry4] : B/L cataracts scheduled for cataract surgery.

## 2024-06-05 ENCOUNTER — APPOINTMENT (OUTPATIENT)
Dept: OTOLARYNGOLOGY | Facility: CLINIC | Age: 79
End: 2024-06-05
Payer: MEDICARE

## 2024-06-05 VITALS
SYSTOLIC BLOOD PRESSURE: 148 MMHG | TEMPERATURE: 96.7 F | BODY MASS INDEX: 17.11 KG/M2 | HEART RATE: 78 BPM | WEIGHT: 93 LBS | DIASTOLIC BLOOD PRESSURE: 74 MMHG | HEIGHT: 62 IN | OXYGEN SATURATION: 100 %

## 2024-06-05 PROCEDURE — 92557 COMPREHENSIVE HEARING TEST: CPT

## 2024-06-05 PROCEDURE — 99213 OFFICE O/P EST LOW 20 MIN: CPT

## 2024-06-05 NOTE — HISTORY OF PRESENT ILLNESS
[de-identified] :  80 y/o F with right hearing loss presents to the office with C/O clicking on B/L ears. 1X Steroid injection given in left ear. Hearing test with audio completed. Hearing has improved. MRI completed. Pt reports 2 episodes of vertigo from previous visit. MRI ok Adv HAF

## 2024-06-07 RX ORDER — ALPRAZOLAM 0.25 MG/1
0.25 TABLET ORAL
Qty: 60 | Refills: 0 | Status: ACTIVE | COMMUNITY
Start: 2023-07-17 | End: 1900-01-01

## 2024-06-07 RX ORDER — ZOLPIDEM TARTRATE 10 MG/1
10 TABLET ORAL
Qty: 30 | Refills: 3 | Status: ACTIVE | COMMUNITY
Start: 2023-07-18 | End: 1900-01-01

## 2024-06-07 RX ORDER — AMLODIPINE BESYLATE 5 MG/1
5 TABLET ORAL DAILY
Qty: 30 | Refills: 2 | Status: ACTIVE | COMMUNITY
Start: 2024-04-11 | End: 1900-01-01

## 2024-06-14 NOTE — ASU PATIENT PROFILE, ADULT - FALL HARM RISK - PT AGE POPULATION HIDDEN
Proper hand hygiene was performed/Sterile gloves were worn for the duration of the procedure/A sterile drape was used to cover all adjacent areas/The site was cleaned with soap/water and sterile solution (betadine)/The catheter was appropriately lubricated/The catheter was secured with a securement device (e.g. StatLock)/The urinary drainage system is closed at the end of the procedure/The collection bag is below the level of the patient and urinary bladder/All applicable medical record documentation is completed Adult

## 2024-06-14 NOTE — ASU PATIENT PROFILE, ADULT - FALL HARM RISK - UNIVERSAL INTERVENTIONS
Bed in lowest position, wheels locked, appropriate side rails in place/Call bell, personal items and telephone in reach/Instruct patient to call for assistance before getting out of bed or chair/Non-slip footwear when patient is out of bed/[place] to call system/Physically safe environment - no spills, clutter or unnecessary equipment/Purposeful Proactive Rounding/Room/bathroom lighting operational, light cord in reach

## 2024-06-14 NOTE — ASU PATIENT PROFILE, ADULT - NSICDXPASTSURGICALHX_GEN_ALL_CORE_FT
PAST SURGICAL HISTORY:  History of appendectomy     History of bilateral carpal tunnel release     History of tonsillectomy

## 2024-06-16 RX ORDER — SODIUM CHLORIDE 9 MG/ML
1000 INJECTION, SOLUTION INTRAVENOUS
Refills: 0 | Status: DISCONTINUED | OUTPATIENT
Start: 2024-06-17 | End: 2024-06-17

## 2024-06-16 RX ORDER — ACETAMINOPHEN 500 MG
1000 TABLET ORAL ONCE
Refills: 0 | Status: DISCONTINUED | OUTPATIENT
Start: 2024-06-17 | End: 2024-06-17

## 2024-06-16 RX ORDER — OXYCODONE HYDROCHLORIDE 5 MG/1
5 TABLET ORAL ONCE
Refills: 0 | Status: DISCONTINUED | OUTPATIENT
Start: 2024-06-17 | End: 2024-06-17

## 2024-06-16 NOTE — PRE-ANESTHESIA EVALUATION ADULT - NSANTHTIREDRD_ENT_A_CORE
Patient has been taking Flomax and Dutasteride and for the last 4-6 weeks has had a rash. He says it's kind of patchy and is noticeable in about a dozen different places of his body. Patient says the rash is not painful but is slightly itchy and would like to know if it could be caused by the medication.  
Patient reports rash beginning March of this year.  Small red slightly raised bumps are present on extremeties.      Per Dr. Carpenter, patient to try discontinuing medication for 7 days to see if rash resolves.  Patient agreeable.    
No

## 2024-06-17 ENCOUNTER — TRANSCRIPTION ENCOUNTER (OUTPATIENT)
Age: 79
End: 2024-06-17

## 2024-06-17 ENCOUNTER — APPOINTMENT (OUTPATIENT)
Dept: OPHTHALMOLOGY | Facility: AMBULATORY SURGERY CENTER | Age: 79
End: 2024-06-17

## 2024-06-17 ENCOUNTER — OUTPATIENT (OUTPATIENT)
Dept: OUTPATIENT SERVICES | Facility: HOSPITAL | Age: 79
LOS: 1 days | Discharge: HOME | End: 2024-06-17
Payer: MEDICARE

## 2024-06-17 VITALS
WEIGHT: 95.46 LBS | TEMPERATURE: 97 F | HEART RATE: 66 BPM | SYSTOLIC BLOOD PRESSURE: 134 MMHG | HEIGHT: 62 IN | DIASTOLIC BLOOD PRESSURE: 66 MMHG | OXYGEN SATURATION: 99 % | RESPIRATION RATE: 16 BRPM

## 2024-06-17 VITALS
OXYGEN SATURATION: 99 % | TEMPERATURE: 97 F | HEART RATE: 66 BPM | RESPIRATION RATE: 16 BRPM | SYSTOLIC BLOOD PRESSURE: 125 MMHG | DIASTOLIC BLOOD PRESSURE: 68 MMHG

## 2024-06-17 DIAGNOSIS — Z90.89 ACQUIRED ABSENCE OF OTHER ORGANS: Chronic | ICD-10-CM

## 2024-06-17 DIAGNOSIS — Z90.49 ACQUIRED ABSENCE OF OTHER SPECIFIED PARTS OF DIGESTIVE TRACT: Chronic | ICD-10-CM

## 2024-06-17 DIAGNOSIS — Z98.890 OTHER SPECIFIED POSTPROCEDURAL STATES: Chronic | ICD-10-CM

## 2024-06-17 PROCEDURE — 66984 XCAPSL CTRC RMVL W/O ECP: CPT | Mod: RT

## 2024-06-17 DEVICE — LENS IOL TECNIS EYHANCE DIB00 19.0D
Type: IMPLANTABLE DEVICE | Status: NON-FUNCTIONAL
Removed: 2024-06-17

## 2024-06-17 RX ORDER — SODIUM CHLORIDE 9 MG/ML
1000 INJECTION, SOLUTION INTRAVENOUS
Refills: 0 | Status: DISCONTINUED | OUTPATIENT
Start: 2024-06-17 | End: 2024-06-17

## 2024-06-17 RX ORDER — PHENYLEPHRINE HCL 2.5 %
1 DROPS OPHTHALMIC (EYE)
Refills: 0 | Status: COMPLETED | OUTPATIENT
Start: 2024-06-17 | End: 2024-06-17

## 2024-06-17 RX ORDER — TROPICAMIDE 1 %
1 DROPS OPHTHALMIC (EYE)
Refills: 0 | Status: COMPLETED | OUTPATIENT
Start: 2024-06-17 | End: 2024-06-17

## 2024-06-17 RX ORDER — OFLOXACIN 0.3 %
1 DROPS OPHTHALMIC (EYE)
Refills: 0 | Status: COMPLETED | OUTPATIENT
Start: 2024-06-17 | End: 2024-06-17

## 2024-06-17 RX ADMIN — Medication 1 DROP(S): at 07:35

## 2024-06-17 RX ADMIN — Medication 1 DROP(S): at 07:30

## 2024-06-17 RX ADMIN — Medication 1 DROP(S): at 07:25

## 2024-06-17 NOTE — OPERATIVE REPORT - OPERATIVE RPOSRT DETAILS
SURGEON: Zach Bello MD    ASSISTANT: Yvonne Heck MD    PRE-OP DIAGNOSIS: Cataract Right Eye    POST-OP DIAGNOSIS: Same    ANESTHESIA: Mac    PROCEDURE: Cataract extraction with intraocular lens implant right eye    SPECIMEN/TISSUE REMOVED: None    ESTIMATED BLOOD LOSS: < 1mL    COMPLICATIONS: None    IMPLANT: DIB00 +19.0D    PROCEDURE:    The patient was seen in the preoperative area. The risks, benefits, and alternatives to surgery were discussed. All questions were answered.  The patient was given standard preoperative drops. The patient was then brought to the operating room and prepped and draped in the usual sterile fashion for ophthalmic surgery.    A lid speculum was used to expose the eye.  A paracentesis was created with an MVR blade at 3 clock hours from the temporal limbus in the clockwise direction.  Next, 1% Preservative-free Lidocaine was instilled into the anterior chamber followed by viscoat.  A clear corneal incision was created with the aid of a crescent blade and a 2.75 mm sharped tip keratome.  A cystotome and Utrata forceps was used to create a continuous curvilinear capsulorrhexis.  Balanced salt solution was used for hydro dissection.  The nucleus was then phacoemulsified and aspirated using a divide and conquer technique.  The remaining epinucleus and cortical material was aspirated from the eye.  The capsular bag was then reformed using provisc in anticipation of the introduction of an intraocular lens implant.  The implant was injected into the capsular bag.  After centration, the remaining viscoelastic material was removed using the IA handpiece.    The temporal clear corneal and paracentesis incisions were hydrated with balanced salt solution to achieve adequate watertight closure. The wounds were checked for leaks and found to be negative.    A combination of antibiotic and steroid were applied to the surface of the eye, and the eye was shielded.    The patient tolerated the procedure well and was transferred to the recovery room in stable condition. They received standard postoperative instructions and an appointment to follow up the next day.

## 2024-06-17 NOTE — ASU PREOP CHECKLIST - HEIGHT IN CM
Neuroendovascualar Consult Note    Name  Josy Matthews  MRN [id number]   1933  Date 2017  Service requesting consult: critical care  Physician requesting consult: Dr. JACINDA Trevizo  Reason for consult: intracranial stenosis with concern for episodic hypoperfusion causing stroke like symptoms    Chief Complaint: multifocal intracranial stenosis with concern for episodic hypoperfusion causing stroke like symptoms    HPI: Josy Matthews is a 84 year old female with medical history significant for iron deficiency anemia, chronic diastolic heart failure, CKD stage 3, chronic pain, CAD, gout, hyperlipidemia, breast CA s/p right lumpectomy, DM type 2, HTN who presented to the hospital with symptoms of heart failure exacerbation on  and was treated with diuretics and antihypertensives. She was noted to have SHARMILA d/t over-diuresis on  and bradycardia with her BBs. She was able to transfer to [facility] on . On 12/3 she was noted to have episode of dizziness during OT session and went on to have unresponsive episode followed by slurred speech and right sided weakness. NIHSS was 19 and she was treated with tPA. She was not a LVO candidate as her NIHSS improved to 3 with fluid resuscitation and tPA upon arrival to [facility]. Patient had recurrence of symptoms yesterday when her blood pressure trended down to SBP of 120s-130s. She was given 500NS bolus and symptoms resolved.     Our service has been asked to see patient to evaluate her chronic multifocal intracranial stenosis in setting of acute exacerbations of right sided symptoms, recent heart failure exacerbation, and SHARMILA r/t over-diuresis.     Today patient feels much better; she is \"almost\" back to her baseline. Continues to have intermittent dizziness and headache but no other focal deficits. She is sitting up in chair eating breakfast.     Assessment:  · High-grade stenosis of the distal right internal carotid artery terminus extending to the right M1 and  A1 origins  · Short segment near- versus total-occlusion of the proximal right M1 with compromised right MCA flow  · Mild-to-moderate atheromatous stenosis of the distal left M1 and proximal M2 branches  · Bilateral posterior ICA terminus infundibulum vs small aneurysms  · CKD stage 3 with recent SHARMILA  · Hyperlipidemia (taking Lipitor 40 mg daily PTA)  · HTN  · Chronic diastolic heart failure (taking Coreg 25 mg daily, lasix 80 mg BID, hydralazine 75 mg TID, clonidine 0.3 mg BID, imdur 30 mg daily PTA)  · CAD (taking ASA 81 mg and plavix 75 mg daily)  · Chronic dizziness (taking meclizine PTA)  · Chronic pain (taking gabapentin 200 mg BID, baclofen 10 mg nightly, prn tylenol PTA)  · Iron deficiency anemia (taking ferrous sulfate 325 mg BID PTA)  · GERD (taking Protonix 40 mg nightly PTA)  · Anxiety (taking xanax 0.25 mg BID PTA)  · Risk for polypharmacy    Plan:  1. Medical management per critical care  · Permissive hypertension with SBP goal < 160  · Ideally blood pressure would be 150-160  · Holding hydralazine, Imdur, lasix  · Coreg resumed at 6.25 mg dose and will slowly increase over the next week to home dose of 25 mg.   · Serial CBC, will likely transfuse for Hgb < 7.0  · Transfer to floor later today  2. Response labs in am to determine if ASA and plavix are therapeutic, may need to adjust antiplatelet medications or dosages  3. Consider increasing Lipitor to 80 mg daily for high grade intracranial stenosis  4. Avoid diuretics if possible  5. Long term blood pressure goal should be 150/90; as patient needs higher pressures for cerebral perfusion in setting of intracranial hypertension  6. Consider geriatrics consult to help with polypharmacy and long term goals  7. No acute intervention for intracranial stenosis needed at this time  8. Will continue to monitor with repeat imaging in future; due to advanced age and multiple co-morbidities invasive treatment options may not be beneficial.  9. Medical management  with permissive hypertension, avoiding dehydration, monitoring anemia and treating low hemoglobin, high dose statin and dual antiplatelet therapy  10. If patient has another episode of right sided deficits recommend:  return to bed; lay flat, NS bolus 500 CC, consider holding antihypertensives, check blood pressure.   11. Please page with any questions or concerns.  12. Would be happy to update family if they come bedside; just page.    Past Medical History:   Diagnosis Date   • Anemia    • Arthritis    • Breast cancer (CMS/HCC)    • Chronic diastolic heart failure (CMS/HCC) 11/11/2017   • Chronic kidney disease, stage III (moderate)    • Chronic pain    • Coronary artery disease    • Gout    • High cholesterol    • Malignant neoplasm (CMS/HCC)    • Pneumonia    • RAD (reactive airway disease)    • Sinusitis, chronic    • Type II or unspecified type diabetes mellitus without mention of complication, not stated as uncontrolled    • Unspecified essential hypertension    • Urinary incontinence      Past Surgical History:   Procedure Laterality Date   • APPENDECTOMY     • BACK SURGERY     • BREAST SURGERY      lumpectomy- rt breast   • CARDIAC CATHERIZATION     • CARPAL TUNNEL RELEASE     • EYE SURGERY      cataract both eyes   • HYSTERECTOMY     • JOINT REPLACEMENT      hip replacement- right   • MASTECTOMY PARTIAL      Mastectomy, partial   • SPINE SURGERY PROCEDURE UNLISTED      Unspecified spine procedure   • TOTAL HIP REPLACEMENT  11-    Hip replacement RT hip     Social History     Social History   • Marital status:      Spouse name: N/A   • Number of children: N/A   • Years of education: N/A     Occupational History   • Not on file.     Social History Main Topics   • Smoking status: Never Smoker   • Smokeless tobacco: Never Used   • Alcohol use No   • Drug use: No   • Sexual activity: Not on file     Other Topics Concern   • Not on file     Social History Narrative   • No narrative on file      Family History   Problem Relation Age of Onset   • Heart Brother    • Early death Brother    • Heart Father    • Stroke Father    • Cancer Maternal Aunt    • Stroke Mother    • Hypertension Mother    • Cancer Paternal Aunt    • Cancer Brother      lung cancer   • Heart Maternal Grandmother    • Diabetes Maternal Grandfather      ALLERGIES:   Allergen Reactions   • Amlodipine SWELLING   • Cymbalta [Duloxetine Hcl] Other (See Comments)   • Glipizide Other (See Comments)   • Penicillins    • Talwin Other (See Comments)   • Tegretol    • Zithromax [Azithromycin] Other (See Comments)       Medications:  Current Facility-Administered Medications   Medication Dose Route Frequency Provider Last Rate Last Dose   • carvedilol (COREG) tablet 6.25 mg  6.25 mg Oral BID WC Ondina Cavanaugh NP       • atorvastatin (LIPITOR) tablet 40 mg  40 mg Oral Nightly Jose Trevizo MD   40 mg at 12/04/17 2122   • aspirin chewable 81 mg  81 mg Oral Daily Ondina Cavanaugh NP   81 mg at 12/05/17 0832   • clopidogrel (PLAVIX) tablet 75 mg  75 mg Oral Daily Ondina Cavanaugh NP   75 mg at 12/05/17 0832   • sodium chloride 0.9% infusion   Intravenous Continuous Ondina Cavanaugh NP 50 mL/hr at 12/04/17 2123     • acetaminophen (TYLENOL) tablet 650 mg  650 mg Oral Q4H PRN New Yates MD   650 mg at 12/04/17 1741    Or   • acetaminophen (TYLENOL) suppository 650 mg  650 mg Rectal Q4H PRN New Yates MD       • sodium chloride (NORMAL SALINE) 0.9 % bolus 500 mL  500 mL Intravenous PRN New Yates MD       • nitroGLYcerin (NITROSTAT) sublingual tablet 0.4 mg  0.4 mg Sublingual Q5 Min PRN New Yates MD       • potassium chloride (K-DUR,KLOR-CON) CR tablet 20 mEq  20 mEq Oral Q4H PRN New Yates MD       • potassium chloride (KLOR-CON) packet 20 mEq  20 mEq Per NG tube Q4H PRN New Yates MD       • potassium chloride 20 mEq/100mL IVPB premix  20 mEq Intravenous Q4H PRN New Yates MD       • potassium chloride  (K-DUR,KLOR-CON) CR tablet 40 mEq  40 mEq Oral Q4H PRN New Yates MD       • potassium chloride (KLOR-CON) packet 40 mEq  40 mEq Per NG tube Q4H PRN New Yates MD       • potassium chloride 20 mEq/100mL IVPB premix  40 mEq Intravenous Q4H PRN New Yates MD       • labetalol (NORMODYNE) injection 10 mg  10 mg Intravenous Q5 Min PRN New Yates MD   10 mg at 12/03/17 1443   • docusate sodium-sennosides (SENOKOT S) 50-8.6 MG 2 tablet  2 tablet Oral Daily PRN New Yates MD       • magnesium hydroxide (MILK OF MAGNESIA) concentrate 2,400 mg  10 mL Oral Daily PRN New Yates MD       • bisacodyl (DULCOLAX) suppository 10 mg  10 mg Rectal Daily PRN New Yates MD       • magnesium sulfate 2 g in 50 mL premix IVPB  2 g Intravenous Daily PRN New Yates MD       • magnesium sulfate 1 g in dextrose 5% 100 mL IVPB premix  1 g Intravenous Daily PRN New Yates MD       • potassium phosphate/sodium phosphate (K PHOS NEUTRAL) tablet 1 tablet  250 mg Oral BID PRN New Yates MD       • hydrALAZINE (APRESOLINE) injection 10-20 mg  10-20 mg Intravenous Q1H PRN New Yates MD   20 mg at 12/03/17 1501   • ferrous sulfate tablet 325 mg  325 mg Oral Daily with breakfast Clifford Monzon MD   325 mg at 12/05/17 0843   • minoxidil (LONITEN) tablet 5 mg  5 mg Oral Daily Mendel Schilling MD   5 mg at 12/05/17 0833   • aluminum-magnesium hydroxide-simethicone (MAALOX) 200-200-20 MG/5ML suspension 30 mL  30 mL Oral Q4H PRN Marlene Mejia MD   30 mL at 12/02/17 2153   • hydrALAZINE (APRESOLINE) tablet 100 mg  100 mg Oral TID Juana Sy NP   Stopped at 12/04/17 1454   • isosorbide mononitrate (IMDUR) ER tablet 60 mg  60 mg Oral Daily Juana Sy NP   60 mg at 12/04/17 0937   • sodium chloride (PF) 0.9 % injection 2 mL  2 mL Injection 2 times per day Esayas T Gebreyesus, MD   2 mL at 12/05/17 0842   • sodium chloride (PF) 0.9 % injection 2 mL  2 mL Injection  PRN Esayas T Gebreyesus, MD   2 mL at 11/29/17 2205   • sodium chloride 0.9 % flush bag 500 mL  500 mL Intravenous PRN Esayas T Gebreyesus, MD       • magnesium sulfate 2 g in 50 mL premix IVPB  2 g Intravenous Q4H PRN Esayas T Gebreyesus, MD       • acetaminophen (TYLENOL) tablet 1,000 mg  1,000 mg Oral TID Esayas T Gebreyesus, MD   1,000 mg at 12/05/17 0832   • albuterol inhaler 2 puff  2 puff Inhalation Q4H Resp PRN Esayas T Gebreyesus, MD       • ALPRAZolam (XANAX) tablet 0.25 mg  0.25 mg Oral Q8H PRN Esayas T Gebreyesus, MD   0.25 mg at 12/04/17 2347   • fluticasone-salmeterol (ADVAIR DISKUS) 100-50 MCG/DOSE inhaler 1 puff  1 puff Inhalation BID Resp Esayas T Gebreyesus, MD   1 puff at 12/05/17 0719   • fluticasone (FLONASE) 50 MCG/ACT nasal spray 2 spray  2 spray Each Nare Daily Esayas T Gebreyesus, MD   2 spray at 12/05/17 0843   • latanoprost (XALATAN) 0.005 % ophthalmic solution 1 drop  1 drop Left Eye Nightly Esayas T Gebreyesus, MD   1 drop at 12/04/17 2137   • pantoprazole (PROTONIX) EC tablet 40 mg  40 mg Oral Nightly Esayas T Gebreyesus, MD   40 mg at 12/04/17 2122   • ranolazine (RANEXA) 12 hr tablet 500 mg  500 mg Oral 2 times per day Esayas T Gebreyesus, MD   500 mg at 12/05/17 0833   • sertraline (ZOLOFT) tablet 50 mg  50 mg Oral Daily Esayas T Gebreyesus, MD   50 mg at 12/05/17 0832   • folic acid (FOLATE) tablet 800 mcg  800 mcg Oral Daily Esayas T Gebreyesus, MD   800 mcg at 12/05/17 0832   • dextrose 50 % injection 25 g  25 g Intravenous PRN Esayas T Gebreyesus, MD       • dextrose 5 % infusion   Intravenous Continuous PRN Esayas T Gebreyesus, MD       • glucagon (GLUCAGEN) injection 1 mg  1 mg Intramuscular PRN Esayas T Gebreyesus, MD       • dextrose (GLUTOSE) 40 % gel 15 g  15 g Oral PRN Esayas T Gebreyesus, MD       • gabapentin (NEURONTIN) capsule 200 mg  200 mg Oral 2 times per day Karlos Villalba MD   200 mg at 12/05/17 0833       Labs:  WBC (K/mcL)   Date Value   12/05/2017 8.2     RBC  (mil/mcL)   Date Value   12/05/2017 2.43 (L)     HCT (%)   Date Value   12/05/2017 23.4 (L)     HGB (g/dL)   Date Value   12/05/2017 7.8 (L)     PLT (K/mcL)   Date Value   12/05/2017 298     Sodium (mmol/L)   Date Value   12/04/2017 140     Potassium (mmol/L)   Date Value   12/04/2017 4.4     Chloride (mmol/L)   Date Value   12/04/2017 107     Glucose (mg/dL)   Date Value   12/04/2017 107 (H)     CALCIUM (mg/dL)   Date Value   12/04/2017 8.5     Carbon Dioxide (mmol/L)   Date Value   12/04/2017 26     BUN (mg/dL)   Date Value   12/04/2017 37 (H)     Creatinine (mg/dL)   Date Value   12/04/2017 2.33 (H)   .  INR (no units)   Date Value   12/04/2017 1.2       Imaging:  CT head:   Results for orders placed during the hospital encounter of 11/06/17   CT Head Brain    Impression IMPRESSION:      1.  No acute intracranial abnormality.  2.  Multiple small foci of gas within the soft tissues of the face,  tracking along the venous vasculature with extension into the cavernous  sinus. Given the lack of surrounding inflammation findings are likely of  benign etiology such as an IV infiltration.    I have reviewed the images and agree with the resident interpretation.    These results were communicated by Darrion Gonzalez MD to Dr. Page on  11/6/2017 at 3:59 AM.       MRI Brain:   Results for orders placed during the hospital encounter of 11/25/17   MRI Brain    Impression IMPRESSION:    MRI BRAIN:  1.  No evidence for acute ischemia.  2.  Mild cerebral atrophy and moderate periventricular T2 FLAIR foci most  likely representative of microvascular ischemic disease in patients of this  age.  3.  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    MRA BRAIN:  1.  High-grade stenosis of the distal right internal carotid artery  terminus extending to the right M1 and A1 origins.  2.  Short segment near- versus total-occlusion of the proximal right M1  measuring 3-4 mm. Distal right MCA flow related enhancement  is  significantly compromised.  CT or MRI perfusion may be useful for further  evaluation in this setting.  3.  Mild-to-moderate atheromatous stenosis of the distal left M1 and  proximal M2 branches.  4.  Bilateral posterior ICA terminus 3 mm luminal outpouchings at the level  of the posterior communicators. Differential includes remnant posterior  communicator infundibulum versus small saccular aneurysms. Follow-up as  clinically indicated.    MRA NECK:  1.  Mild motion and pulsation artifact degrades evaluation of proximal  arterial vasculature.  2.  No hemodynamically significant stenosis within the neck.  3.  Incidental note of a small right-sided pleural effusion.    I have reviewed the images and agree with the Resident interpretation       REVIEW OF SYSTEMS:  Denies changes in vision, dysphagia, dysarthria, new alterations in sensation, numbness, tingling, or focal weakness. Positive for headache and chronic BLE nerve pain r/t DM.      Physical Exam:  Vitals:    12/05/17 0800   BP:    Pulse:    Resp:    Temp: 98.4 °F (36.9 °C)       General: awake, alert, oriented X 3, sitting up in bed, appears stated age  Neurologic: non-dysarthric, no obvious aphasia, pupils 2 mm and PERRLA bilaterally, visual fields full to confrontation, no visual neglect or extinction, + nystagmus, sensation intact throughout without extinction or neglect, face symmetric, hearing intact bilaterally, no drift to BUE or BLE, strength 4+/5 to BUE/BLE, no obvious ataxia.  Cardiovascular: RRR, no appreciable murmur  Lungs: Clear to auscultation, no crackles or wheezes  Skin: No rashes, skin warm and dry  Extremities: +2 radial and +1 pedal pulses, no edema noted    Discussed with Dr. Trevizo, Dr. Mcclellan, patient, and RN.    Thank you for allowing to participate in the care of Josy Matthews.     12/5/2017  GEORGE Cummings  Neuro Interventional (Tosha Platt and Vy)  [facility]  Pager  [phone]  Answering service: [phone]    Dr. Mcclellan's addendum note: patient seen and examined this pm. I agree with MARK Hagan's note, assessment and plan. She is back to her baseline, exam is mild L upper extremity tremor but no weakness. Her MRA showed severe R terminal ICA stenosis/occlusion, and left distal M1 mild to moderate stenosis. Would recommend maximal medical treatment as above. Pending ASA response and P2Y12 level   157.48

## 2024-06-18 ENCOUNTER — NON-APPOINTMENT (OUTPATIENT)
Age: 79
End: 2024-06-18

## 2024-06-18 ENCOUNTER — APPOINTMENT (OUTPATIENT)
Dept: OPHTHALMOLOGY | Facility: CLINIC | Age: 79
End: 2024-06-18
Payer: MEDICARE

## 2024-06-18 PROCEDURE — 99024 POSTOP FOLLOW-UP VISIT: CPT

## 2024-06-20 NOTE — DATA REVIEWED
[FreeTextEntry1] : Most recent bone mineral density Date: June 6, 2022 Source: Hologic Site: [facility]  Site	BMD	T-score	Change previous	Change baseline	 Lumbar spine	0.876	-1.6			 Femoral neck	0.457	-3.5			 Total hip           0.593	-2.9			 Distal radius					 DXA comments: Baseline scan at this facility; left hip values  Impression: I have personally reviewed the DXA images and report. There is osteoporosis by the World Health Organization criteria.

## 2024-06-20 NOTE — PHYSICAL EXAM
[Alert] : alert [Healthy Appearance] : healthy appearance [No Acute Distress] : no acute distress [Normal Sclera/Conjunctiva] : normal sclera/conjunctiva [Normal Hearing] : hearing was normal [No Respiratory Distress] : no respiratory distress [No Spinal Tenderness] : no spinal tenderness [Scoliosis] : scoliosis present [No Stigmata of Cushings Syndrome] : no stigmata of Cushings Syndrome [Normal Gait] : normal gait [Normal Insight/Judgement] : insight and judgment were intact [Kyphosis] : no kyphosis present [Acanthosis Nigricans] : no acanthosis nigricans [de-identified] : no moon facies, no supraclavicular fat pads

## 2024-06-20 NOTE — ADDENDUM
[FreeTextEntry1] : I reviewed calcium and vitamin D intake with Ms. Orozco. She had constipation with a calcium supplement. 6/07/24  Recent bone density as below; discussed with Ms. Orozco. There is osteoporosis by the World Health Organization criteria. Vertebral fracture assessment without evidence of compression fractures. We will proceed with denosumab pending insurance authorization. 6/20/24  Most recent bone mineral density Date: June 12, 2024 Source: [facility] Site: [facility] Radiology  Site	BMD	T-score	Change previous	Change baseline	 Lumbar spine	0.928	-1.1		 Femoral neck	0.428	-3.8			 Total hip           0.526	-3.4			 Distal radius					 DXA comments: Baseline scan at this facility; left hip values Vertebral fracture assessment without evidence of compression fractures T9 to L5 (my read)  Impression: I have personally reviewed the DXA images and report. There is osteoporosis by the World Health Organization criteria. Vertebral fracture assessment without evidence of compression fractures.

## 2024-06-20 NOTE — ASSESSMENT
[FreeTextEntry1] : Osteoporosis. She has no history of fragility fracture. She has a remote history of ibandronate therapy. Metabolic evaluation for secondary causes of bone loss previously remarkable for borderline hyperparathyroidism, presumed due to low calcium intake. We discussed the potential benefits and risks of the osteoanabolic and antiresorptive classes of pharmacologic osteoporosis therapy. Osteoanabolic therapy is cost-prohibitive. We discussed the potential benefits and risks of antiresorptive osteoporosis therapy, including but not limited to osteonecrosis of the jaw and atypical femoral fracture. She is amenable to therapy with denosumab pending insurance authorization. We discussed that denosumab must be dosed every 6 months due to rebound increase in bone breakdown with abrupt discontinuation of therapy, with transition to bisphosphonate therapy prior to a "drug holiday." Start denosumab 60 mg SC every 6 months pending insurance authorization Calcium [id number] mg daily from diet and supplements (to be taken in divided doses as no more than 500-600 mg can be absorbed at one time); advised increased dietary and/or supplemental calcium Continue current vitamin D regimen  Diet, exercise and fall prevention discussed Physical therapy for balance and bone health  I reviewed the DXA performed on June 6, 2022 with the patient today. I reviewed the laboratories performed from July 13, 2023 to present with the patient today.  I counseled the patient regarding calcium and vitamin D intake today. I discussed the following osteoporosis therapies: Alendronate, risedronate, ibandronate, zoledronic acid, denosumab

## 2024-06-20 NOTE — HISTORY OF PRESENT ILLNESS
[FreeTextEntry1] : Ms. Orozco is a 79 year-old woman presenting for follow-up of osteoporosis. I saw her for an initial visit in July 2023.   Bone History Osteoporosis diagnosed many years ago on routine bone density (no report available); bone density testing in 2022 significant for T-scores of -1.6 at the lumbar spine, -3.5 at the femoral neck, and -2.9 at the total hip Fracture history: History of multiple toe fractures Family history: Mother and daughter with history of osteoporosis; mother with history of hip fracture Treatment: Remote history of ibandronate use with multiple side effects  Falls: No Height loss: Up to six inches Kidney stones: No Dental health: Irregular appointments, no history of implants, no upcoming procedures planned Exercise: Walks at least 30 minutes most days Dairy intake: 0-1 servings daily (occasional cheese) Calcium supplements: None Multivitamin: None Vitamin D supplements: Does not remember dose  Osteoporosis risk factors include: Postmenopausal status,  race, prior fracture, falls, height loss, small thin bones, tobacco use, excessive alcohol, anorexia, family history, vitamin D deficiency, corticosteroid use, seizure medications, malabsorption, hyperparathyroidism, hyperthyroidism. NEGATIVE EXCEPT: Postmenopausal status,  race, family history  Interim History  Laboratory results from her initial visit as below. PTH borderline elevated with normal serum calcium; her hyperparathyroidism was presumed due to low calcium intake. Results otherwise unremarkable for a secondary cause of bone loss.  Romosozumab was cost-prohibitive. She has had acute loss of hearing in her right ear and has been following with otorhinolaryngology.  She needs upcoming cataract surgery.  She has seen multiple providers; notes reviewed. Last laboratory results reviewed. Her granddaughter is having an upcoming bat [facility].  Medical and surgical history, medications, allergies, social and family history reviewed and updated as needed. 
good balance

## 2024-06-24 ENCOUNTER — NON-APPOINTMENT (OUTPATIENT)
Age: 79
End: 2024-06-24

## 2024-06-24 ENCOUNTER — APPOINTMENT (OUTPATIENT)
Dept: OPHTHALMOLOGY | Facility: CLINIC | Age: 79
End: 2024-06-24
Payer: MEDICARE

## 2024-06-24 PROCEDURE — 99024 POSTOP FOLLOW-UP VISIT: CPT

## 2024-06-25 PROBLEM — M48.00 SPINAL STENOSIS, SITE UNSPECIFIED: Chronic | Status: ACTIVE | Noted: 2024-06-14

## 2024-06-25 PROBLEM — I10 ESSENTIAL (PRIMARY) HYPERTENSION: Chronic | Status: ACTIVE | Noted: 2024-06-14

## 2024-06-25 PROBLEM — J30.2 OTHER SEASONAL ALLERGIC RHINITIS: Chronic | Status: ACTIVE | Noted: 2024-06-14

## 2024-07-01 ENCOUNTER — OUTPATIENT (OUTPATIENT)
Dept: OUTPATIENT SERVICES | Facility: HOSPITAL | Age: 79
LOS: 1 days | Discharge: HOME | End: 2024-07-01
Payer: MEDICARE

## 2024-07-01 ENCOUNTER — TRANSCRIPTION ENCOUNTER (OUTPATIENT)
Age: 79
End: 2024-07-01

## 2024-07-01 ENCOUNTER — APPOINTMENT (OUTPATIENT)
Dept: OPHTHALMOLOGY | Facility: AMBULATORY SURGERY CENTER | Age: 79
End: 2024-07-01

## 2024-07-01 VITALS
OXYGEN SATURATION: 99 % | HEIGHT: 62 IN | DIASTOLIC BLOOD PRESSURE: 71 MMHG | WEIGHT: 94.8 LBS | RESPIRATION RATE: 15 BRPM | HEART RATE: 64 BPM | SYSTOLIC BLOOD PRESSURE: 152 MMHG | TEMPERATURE: 98 F

## 2024-07-01 VITALS
TEMPERATURE: 97 F | SYSTOLIC BLOOD PRESSURE: 132 MMHG | HEART RATE: 65 BPM | OXYGEN SATURATION: 100 % | DIASTOLIC BLOOD PRESSURE: 68 MMHG | RESPIRATION RATE: 16 BRPM

## 2024-07-01 DIAGNOSIS — Z98.49 CATARACT EXTRACTION STATUS, UNSPECIFIED EYE: Chronic | ICD-10-CM

## 2024-07-01 DIAGNOSIS — Z90.89 ACQUIRED ABSENCE OF OTHER ORGANS: Chronic | ICD-10-CM

## 2024-07-01 DIAGNOSIS — Z90.49 ACQUIRED ABSENCE OF OTHER SPECIFIED PARTS OF DIGESTIVE TRACT: Chronic | ICD-10-CM

## 2024-07-01 DIAGNOSIS — Z98.890 OTHER SPECIFIED POSTPROCEDURAL STATES: Chronic | ICD-10-CM

## 2024-07-01 PROCEDURE — 66984 XCAPSL CTRC RMVL W/O ECP: CPT | Mod: LT,79

## 2024-07-01 DEVICE — LENS IOL TECNIS EYHANCE DIB00 20.0D
Type: IMPLANTABLE DEVICE | Site: LEFT | Status: NON-FUNCTIONAL
Removed: 2024-07-01

## 2024-07-01 RX ORDER — CETIRIZINE HYDROCHLORIDE 10 MG/1
1 TABLET ORAL
Refills: 0 | DISCHARGE

## 2024-07-01 RX ORDER — PHENYLEPHRINE HCL 2.5 %
1 DROPS OPHTHALMIC (EYE)
Refills: 0 | Status: COMPLETED | OUTPATIENT
Start: 2024-07-01 | End: 2024-07-01

## 2024-07-01 RX ORDER — TETRACAINE HCL 0.5 %
1 DROPS OPHTHALMIC (EYE) ONCE
Refills: 0 | Status: COMPLETED | OUTPATIENT
Start: 2024-07-01 | End: 2024-07-01

## 2024-07-01 RX ORDER — AMLODIPINE BESYLATE 2.5 MG/1
1 TABLET ORAL
Refills: 0 | DISCHARGE

## 2024-07-01 RX ORDER — TROPICAMIDE 0.5 %
1 DROPS OPHTHALMIC (EYE)
Refills: 0 | Status: COMPLETED | OUTPATIENT
Start: 2024-07-01 | End: 2024-07-01

## 2024-07-01 RX ORDER — DIPHENHYDRAMINE HCL 50 MG
5 CAPSULE ORAL
Refills: 0 | DISCHARGE

## 2024-07-01 RX ORDER — OFLOXACIN 3 MG/ML
1 SOLUTION/ DROPS OPHTHALMIC
Refills: 0 | Status: COMPLETED | OUTPATIENT
Start: 2024-07-01 | End: 2024-07-01

## 2024-07-01 RX ORDER — DEXTROSE MONOHYDRATE AND SODIUM CHLORIDE 5; .3 G/100ML; G/100ML
500 INJECTION, SOLUTION INTRAVENOUS
Refills: 0 | Status: DISCONTINUED | OUTPATIENT
Start: 2024-07-01 | End: 2024-07-01

## 2024-07-01 RX ORDER — ACETAMINOPHEN 325 MG
650 TABLET ORAL ONCE
Refills: 0 | Status: DISCONTINUED | OUTPATIENT
Start: 2024-07-01 | End: 2024-07-01

## 2024-07-01 RX ADMIN — Medication 1 DROP(S): at 07:02

## 2024-07-01 RX ADMIN — Medication 1 DROP(S): at 06:45

## 2024-07-01 RX ADMIN — Medication 1 DROP(S): at 06:56

## 2024-07-01 RX ADMIN — OFLOXACIN 1 DROP(S): 3 SOLUTION/ DROPS OPHTHALMIC at 06:45

## 2024-07-01 RX ADMIN — Medication 1 DROP(S): at 06:46

## 2024-07-01 RX ADMIN — Medication 1 DROP(S): at 07:01

## 2024-07-01 RX ADMIN — Medication 1 DROP(S): at 06:55

## 2024-07-01 RX ADMIN — OFLOXACIN 1 DROP(S): 3 SOLUTION/ DROPS OPHTHALMIC at 06:56

## 2024-07-01 RX ADMIN — OFLOXACIN 1 DROP(S): 3 SOLUTION/ DROPS OPHTHALMIC at 07:02

## 2024-07-02 ENCOUNTER — APPOINTMENT (OUTPATIENT)
Dept: OPHTHALMOLOGY | Facility: CLINIC | Age: 79
End: 2024-07-02
Payer: MEDICARE

## 2024-07-02 ENCOUNTER — NON-APPOINTMENT (OUTPATIENT)
Age: 79
End: 2024-07-02

## 2024-07-02 PROCEDURE — 99024 POSTOP FOLLOW-UP VISIT: CPT

## 2024-07-03 ENCOUNTER — APPOINTMENT (OUTPATIENT)
Dept: INTERNAL MEDICINE | Facility: CLINIC | Age: 79
End: 2024-07-03
Payer: MEDICARE

## 2024-07-03 VITALS
RESPIRATION RATE: 17 BRPM | BODY MASS INDEX: 16.93 KG/M2 | HEIGHT: 62 IN | HEART RATE: 66 BPM | DIASTOLIC BLOOD PRESSURE: 77 MMHG | OXYGEN SATURATION: 100 % | SYSTOLIC BLOOD PRESSURE: 157 MMHG | WEIGHT: 92 LBS

## 2024-07-03 DIAGNOSIS — E53.8 DEFICIENCY OF OTHER SPECIFIED B GROUP VITAMINS: ICD-10-CM

## 2024-07-03 DIAGNOSIS — G47.00 INSOMNIA, UNSPECIFIED: ICD-10-CM

## 2024-07-03 PROBLEM — Z86.69 PERSONAL HISTORY OF OTHER DISEASES OF THE NERVOUS SYSTEM AND SENSE ORGANS: Chronic | Status: ACTIVE | Noted: 2024-07-01

## 2024-07-03 PROCEDURE — 99213 OFFICE O/P EST LOW 20 MIN: CPT | Mod: 25

## 2024-07-03 PROCEDURE — 96372 THER/PROPH/DIAG INJ SC/IM: CPT

## 2024-07-03 RX ORDER — CYANOCOBALAMIN 1000 UG/ML
1000 INJECTION INTRAMUSCULAR; SUBCUTANEOUS
Qty: 0 | Refills: 0 | Status: COMPLETED | OUTPATIENT
Start: 2024-07-03

## 2024-07-03 RX ADMIN — CYANOCOBALAMIN 0 MCG/ML: 1000 INJECTION INTRAMUSCULAR; SUBCUTANEOUS at 00:00

## 2024-07-05 ENCOUNTER — NON-APPOINTMENT (OUTPATIENT)
Age: 79
End: 2024-07-05

## 2024-07-05 ENCOUNTER — APPOINTMENT (OUTPATIENT)
Dept: OPHTHALMOLOGY | Facility: CLINIC | Age: 79
End: 2024-07-05
Payer: MEDICARE

## 2024-07-05 PROCEDURE — 99024 POSTOP FOLLOW-UP VISIT: CPT

## 2024-07-08 ENCOUNTER — APPOINTMENT (OUTPATIENT)
Dept: OPHTHALMOLOGY | Facility: CLINIC | Age: 79
End: 2024-07-08
Payer: MEDICARE

## 2024-07-08 ENCOUNTER — NON-APPOINTMENT (OUTPATIENT)
Age: 79
End: 2024-07-08

## 2024-07-08 PROCEDURE — 99024 POSTOP FOLLOW-UP VISIT: CPT

## 2024-08-05 ENCOUNTER — APPOINTMENT (OUTPATIENT)
Dept: INTERNAL MEDICINE | Facility: CLINIC | Age: 79
End: 2024-08-05

## 2024-08-05 ENCOUNTER — MED ADMIN CHARGE (OUTPATIENT)
Age: 79
End: 2024-08-05

## 2024-08-05 PROBLEM — H00.12 CHALAZION OF RIGHT LOWER EYELID: Status: ACTIVE | Noted: 2024-04-18

## 2024-08-05 PROCEDURE — G2211 COMPLEX E/M VISIT ADD ON: CPT

## 2024-08-05 PROCEDURE — 99214 OFFICE O/P EST MOD 30 MIN: CPT | Mod: 25

## 2024-08-05 PROCEDURE — 96372 THER/PROPH/DIAG INJ SC/IM: CPT

## 2024-08-05 NOTE — PHYSICAL EXAM
[No Acute Distress] : no acute distress [Well-Appearing] : well-appearing [No Edema] : there was no peripheral edema [Normal] : affect was normal and insight and judgment were intact [Normal Sclera/Conjunctiva] : normal sclera/conjunctiva [de-identified] : lids appear WNL

## 2024-08-05 NOTE — HISTORY OF PRESENT ILLNESS
[FreeTextEntry1] : f/u visit for B12 injection [de-identified] : Pt is a 80 y/o F with PMHx of HTN, B12 def who presents to the office today for f/u visit for B12 injection  B12 injection: - needs today  HTN: - well controlled  Chalazion: - had issues with chalazion which mostly resolved but still feels a "bump in eyelid." -  is using warm compress few times a day

## 2024-08-12 ENCOUNTER — APPOINTMENT (OUTPATIENT)
Dept: OPHTHALMOLOGY | Facility: CLINIC | Age: 79
End: 2024-08-12
Payer: MEDICARE

## 2024-08-12 ENCOUNTER — NON-APPOINTMENT (OUTPATIENT)
Age: 79
End: 2024-08-12

## 2024-08-12 PROCEDURE — 92250 FUNDUS PHOTOGRAPHY W/I&R: CPT

## 2024-08-12 PROCEDURE — 99024 POSTOP FOLLOW-UP VISIT: CPT

## 2024-08-20 ENCOUNTER — APPOINTMENT (OUTPATIENT)
Dept: ORTHOPEDIC SURGERY | Facility: CLINIC | Age: 79
End: 2024-08-20

## 2024-08-20 DIAGNOSIS — M48.061 SPINAL STENOSIS, LUMBAR REGION WITHOUT NEUROGENIC CLAUDICATION: ICD-10-CM

## 2024-08-20 DIAGNOSIS — M81.0 AGE-RELATED OSTEOPOROSIS W/OUT CURRENT PATHOLOGICAL FRACTURE: ICD-10-CM

## 2024-08-20 PROCEDURE — 99213 OFFICE O/P EST LOW 20 MIN: CPT

## 2024-08-20 NOTE — HISTORY OF PRESENT ILLNESS
[de-identified] : Follow up 8/20/24: Patient returns for follow up.  No significant change in back pain from last visit. No new neurologic symptoms. Has been on prolia for the past month due to high copay with evenity.  Follow up 1/10/24: Patient presents for follow up. No significant change in back pain from last visit. No new neurologic symptoms. Has not yet started evenity.  Follow up 10/9/23: No significant back pain. Reports numbness in the feet chronic. does have intermittent radicular symtpoms. Diagnosed wtih severe osteoporosis, recommended for evenity by dr. franklin, has been unable to start due to high copays.

## 2024-08-20 NOTE — DISCUSSION/SUMMARY
[de-identified] : Severe mutilevel stenosis; numbness in lower extremities may be related to stenosis vs. polyneuropoathy from emg vs. both. scoliosis and severe lumbar degenerative changes, Cervical disc disease, cervical foraminal stenosis  I discussed my findings with the patient and her . Given the extent of her low back pathology and relative mild symptoms at this point, it seems prudent to continue to build up her bone density with an anabolic agent in the meantime as her surgery would be extensive with higher than average risk of mechanical complications due to osteoporosis. She will follow up with me in 6 months, certainly sooner if there are any issues. XR full spine ap/lateral, lumbar flexion/extension at that time. All questions answered.

## 2024-08-20 NOTE — PHYSICAL EXAM
[de-identified] :     Physical Exam:  General: patient is well developed, well nourished, in no acute distress, alert and oriented x 3.  Mood and affect: normal  Respiratory: no respiratory distress noted  Skin: no scars over spine, skin intact, no erythema, increased warmth  Alignment:The spine is well compensated in the coronal and sagittal plane.  Gait: The patient is able to toe walk and heel walk without difficulty. The patient is able to tandem gait without difficutly.  Palpation: no tenderness to palpation spine or paraspinal region  Range of motion: Lumbar spine ROM is restricted  Neurologic Exam: Motor: Manual Muscle testing in the lower extremities is 5 out of 5 in all muscle groups. There is no evidence of muscular atrophy in the lower extremities. Sensory: Sensation to light touch is grossly intact in the lower extremities  Reflexes: DTR are present and symmetric throughout, no clonus, plantar responses are flexor  Hip Exam: No pain with internal or external rotation of hips bilaterally  Special tests: Straight leg raise test negative. Cross straight leg test negative. GEORGIE test negative  Vascular: Examination of the peripheral vascular system demonstrates no evidence of congestion or edema. no evidence of lymphedema bilateral lower extremities, pulses are present and symmetric in both lower extremities. [de-identified] : XR 1/10/24 (my read): scoliosis, lumbar flatback from severe degenerative disc disease l3/4, L4/5, L5/S1. Grade 1/2 spondylolisthesis of L4/5. significant pelvic compensation with moderate forward SVA, no significant change from 4/2023 imaging  Xray 4/3/2023: my read: Xray 4/3/2023: my read: scoliosis, lumbar flatback from severe degenerative disc disease l3/4, L4/5, L5/S1. Grade 1/2 spondylolisthesis of L4/5. significant pelvic compensation with moderate forward SVA.  Lumbar MRI 3/29/23: T12/L1: mild to moderate stenosis; L1/2: moderate to severe stenosis; L2/3: moderate to severe canal stenosis; L3/4:severe canal stenosis; L4/5 severe bilateral lateral recess stenosis, moderate to severe canal stenosis; L5/s1: moderate lateral recess stenosis, moderate foarminal stenosis; right severe foraminal stenosis from scoliosis and degenerative changes l2/3, l3/4, l4/5.

## 2024-08-27 ENCOUNTER — APPOINTMENT (OUTPATIENT)
Dept: OTOLARYNGOLOGY | Facility: CLINIC | Age: 79
End: 2024-08-27
Payer: MEDICARE

## 2024-08-27 VITALS
HEIGHT: 62 IN | SYSTOLIC BLOOD PRESSURE: 120 MMHG | TEMPERATURE: 98 F | DIASTOLIC BLOOD PRESSURE: 67 MMHG | BODY MASS INDEX: 17.11 KG/M2 | OXYGEN SATURATION: 100 % | HEART RATE: 62 BPM | WEIGHT: 93 LBS

## 2024-08-27 DIAGNOSIS — H90.5 UNSPECIFIED SENSORINEURAL HEARING LOSS: ICD-10-CM

## 2024-08-27 PROCEDURE — 99213 OFFICE O/P EST LOW 20 MIN: CPT

## 2024-08-27 PROCEDURE — 92557 COMPREHENSIVE HEARING TEST: CPT

## 2024-08-27 PROCEDURE — 92567 TYMPANOMETRY: CPT

## 2024-08-27 NOTE — DATA REVIEWED
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

## 2024-08-27 NOTE — ASSESSMENT
[FreeTextEntry1] : 1. 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

## 2024-08-27 NOTE — HISTORY OF PRESENT ILLNESS
[de-identified] : 2 month follow up visit for this 80 y/o F with h/o L sudden snhl, r snhl. Patient is post x2 IT dex L, prednisone oral with no improvement to L hearing. Patient to discuss repeat audio exam.  Patient accompanied by .

## 2024-09-04 ENCOUNTER — APPOINTMENT (OUTPATIENT)
Dept: INTERNAL MEDICINE | Facility: CLINIC | Age: 79
End: 2024-09-04
Payer: MEDICARE

## 2024-09-04 VITALS
DIASTOLIC BLOOD PRESSURE: 62 MMHG | HEART RATE: 59 BPM | OXYGEN SATURATION: 99 % | BODY MASS INDEX: 17.11 KG/M2 | WEIGHT: 93 LBS | SYSTOLIC BLOOD PRESSURE: 127 MMHG | TEMPERATURE: 97.5 F | HEIGHT: 62 IN | RESPIRATION RATE: 18 BRPM

## 2024-09-04 DIAGNOSIS — E53.8 DEFICIENCY OF OTHER SPECIFIED B GROUP VITAMINS: ICD-10-CM

## 2024-09-04 DIAGNOSIS — R20.2 PARESTHESIA OF SKIN: ICD-10-CM

## 2024-09-04 PROCEDURE — 96372 THER/PROPH/DIAG INJ SC/IM: CPT

## 2024-09-04 PROCEDURE — 99213 OFFICE O/P EST LOW 20 MIN: CPT | Mod: 25

## 2024-09-04 RX ORDER — CYANOCOBALAMIN 1000 UG/ML
1000 INJECTION INTRAMUSCULAR; SUBCUTANEOUS
Qty: 0 | Refills: 0 | Status: COMPLETED | OUTPATIENT
Start: 2024-09-04

## 2024-09-04 RX ADMIN — CYANOCOBALAMIN 0 MCG/ML: 1000 INJECTION INTRAMUSCULAR; SUBCUTANEOUS at 00:00

## 2024-09-04 NOTE — HISTORY OF PRESENT ILLNESS
[FreeTextEntry1] : B12 inj [de-identified] : HTN - c/w amlodipine 5mg daily  facial paresthesia - left upper lip felt swollen to pt - on exam normal - has baseline facial asymmetry  - no difficulty speaking/swallowing

## 2024-09-04 NOTE — PHYSICAL EXAM
[No Acute Distress] : no acute distress [Normal Sclera/Conjunctiva] : normal sclera/conjunctiva [No Respiratory Distress] : no respiratory distress  [de-identified] : baseline asymetric smile.

## 2024-09-06 ENCOUNTER — APPOINTMENT (OUTPATIENT)
Dept: PHARMACY | Facility: CLINIC | Age: 79
End: 2024-09-06
Payer: MEDICARE

## 2024-09-06 PROCEDURE — V5010 ASSESSMENT FOR HEARING AID: CPT | Mod: NC

## 2024-09-09 ENCOUNTER — NON-APPOINTMENT (OUTPATIENT)
Age: 79
End: 2024-09-09

## 2024-09-09 ENCOUNTER — APPOINTMENT (OUTPATIENT)
Dept: OPHTHALMOLOGY | Facility: CLINIC | Age: 79
End: 2024-09-09
Payer: MEDICARE

## 2024-09-09 PROCEDURE — 99024 POSTOP FOLLOW-UP VISIT: CPT

## 2024-09-10 ENCOUNTER — RX RENEWAL (OUTPATIENT)
Age: 79
End: 2024-09-10

## 2024-09-30 ENCOUNTER — APPOINTMENT (OUTPATIENT)
Dept: PHARMACY | Facility: CLINIC | Age: 79
End: 2024-09-30

## 2024-09-30 ENCOUNTER — APPOINTMENT (OUTPATIENT)
Dept: OTOLARYNGOLOGY | Facility: CLINIC | Age: 79
End: 2024-09-30

## 2024-10-01 ENCOUNTER — APPOINTMENT (OUTPATIENT)
Dept: INTERNAL MEDICINE | Facility: CLINIC | Age: 79
End: 2024-10-01
Payer: MEDICARE

## 2024-10-01 VITALS
HEART RATE: 73 BPM | RESPIRATION RATE: 17 BRPM | BODY MASS INDEX: 17.85 KG/M2 | SYSTOLIC BLOOD PRESSURE: 120 MMHG | DIASTOLIC BLOOD PRESSURE: 72 MMHG | HEIGHT: 62 IN | OXYGEN SATURATION: 98 % | WEIGHT: 97 LBS | TEMPERATURE: 97.7 F

## 2024-10-01 DIAGNOSIS — H02.9 UNSPECIFIED DISORDER OF EYELID: ICD-10-CM

## 2024-10-01 DIAGNOSIS — I10 ESSENTIAL (PRIMARY) HYPERTENSION: ICD-10-CM

## 2024-10-01 DIAGNOSIS — E53.8 DEFICIENCY OF OTHER SPECIFIED B GROUP VITAMINS: ICD-10-CM

## 2024-10-01 DIAGNOSIS — Z23 ENCOUNTER FOR IMMUNIZATION: ICD-10-CM

## 2024-10-01 PROCEDURE — 96372 THER/PROPH/DIAG INJ SC/IM: CPT

## 2024-10-01 PROCEDURE — G2211 COMPLEX E/M VISIT ADD ON: CPT

## 2024-10-01 PROCEDURE — 99213 OFFICE O/P EST LOW 20 MIN: CPT | Mod: 25

## 2024-10-01 RX ORDER — DOXYCYCLINE HYCLATE 100 MG/1
100 CAPSULE ORAL
Qty: 60 | Refills: 0 | Status: COMPLETED | COMMUNITY
Start: 2024-08-12

## 2024-10-01 RX ORDER — CYANOCOBALAMIN 1000 UG/ML
1000 INJECTION INTRAMUSCULAR; SUBCUTANEOUS
Qty: 0 | Refills: 0 | Status: COMPLETED | OUTPATIENT
Start: 2024-10-01

## 2024-10-01 RX ADMIN — CYANOCOBALAMIN 1 MCG/ML: 1000 INJECTION INTRAMUSCULAR; SUBCUTANEOUS at 00:00

## 2024-10-03 NOTE — HEALTH RISK ASSESSMENT
[PHQ-2 Negative - No further assessment needed] : PHQ-2 Negative - No further assessment needed [Never] : Never [[id number]] : 0

## 2024-10-03 NOTE — PHYSICAL EXAM
[No Acute Distress] : no acute distress [Well-Appearing] : well-appearing [No Respiratory Distress] : no respiratory distress  [de-identified] : LEFT lower lid with small punctate white lesions, non-tender, lash line WNL b/l, sclera WNL

## 2024-10-03 NOTE — HEALTH RISK ASSESSMENT
[PHQ-2 Negative - No further assessment needed] : PHQ-2 Negative - No further assessment needed [Never] : Never [[id number]] : 0

## 2024-10-03 NOTE — HISTORY OF PRESENT ILLNESS
[FreeTextEntry1] : B12 injection  [de-identified] : Pt is a 78 y/o F who has office visit today for B12 injection   R eyelid lesion: - pt reports at the waterline feeling a "small bump - no pain, vision changes, discharge, pruritus  L inner eyelid: - pt reports seeing 2 small white bumps inside her L lower eyelid that "look like milia." - no discomfort, vision changes  b12 def - b12 IM LEFT deltoid

## 2024-10-03 NOTE — HISTORY OF PRESENT ILLNESS
[FreeTextEntry1] : B12 injection  [de-identified] : Pt is a 78 y/o F who has office visit today for B12 injection   R eyelid lesion: - pt reports at the waterline feeling a "small bump - no pain, vision changes, discharge, pruritus  L inner eyelid: - pt reports seeing 2 small white bumps inside her L lower eyelid that "look like milia." - no discomfort, vision changes  b12 def - b12 IM LEFT deltoid

## 2024-10-03 NOTE — PHYSICAL EXAM
[No Acute Distress] : no acute distress [Well-Appearing] : well-appearing [No Respiratory Distress] : no respiratory distress  [de-identified] : LEFT lower lid with small punctate white lesions, non-tender, lash line WNL b/l, sclera WNL

## 2024-10-11 ENCOUNTER — RX RENEWAL (OUTPATIENT)
Age: 79
End: 2024-10-11

## 2024-10-30 ENCOUNTER — MED ADMIN CHARGE (OUTPATIENT)
Age: 79
End: 2024-10-30

## 2024-10-30 ENCOUNTER — APPOINTMENT (OUTPATIENT)
Dept: INTERNAL MEDICINE | Facility: CLINIC | Age: 79
End: 2024-10-30
Payer: MEDICARE

## 2024-10-30 VITALS
HEIGHT: 62 IN | SYSTOLIC BLOOD PRESSURE: 136 MMHG | WEIGHT: 99.06 LBS | OXYGEN SATURATION: 100 % | BODY MASS INDEX: 18.23 KG/M2 | HEART RATE: 62 BPM | DIASTOLIC BLOOD PRESSURE: 75 MMHG

## 2024-10-30 DIAGNOSIS — E53.8 DEFICIENCY OF OTHER SPECIFIED B GROUP VITAMINS: ICD-10-CM

## 2024-10-30 DIAGNOSIS — I10 ESSENTIAL (PRIMARY) HYPERTENSION: ICD-10-CM

## 2024-10-30 DIAGNOSIS — L30.9 DERMATITIS, UNSPECIFIED: ICD-10-CM

## 2024-10-30 PROCEDURE — G2211 COMPLEX E/M VISIT ADD ON: CPT | Mod: 25

## 2024-10-30 PROCEDURE — 96372 THER/PROPH/DIAG INJ SC/IM: CPT

## 2024-10-30 PROCEDURE — 99214 OFFICE O/P EST MOD 30 MIN: CPT | Mod: 25

## 2024-10-30 RX ORDER — PIMECROLIMUS 10 MG/G
1 CREAM TOPICAL TWICE DAILY
Qty: 1 | Refills: 0 | Status: ACTIVE | COMMUNITY
Start: 2024-10-30 | End: 1900-01-01

## 2024-10-30 RX ORDER — CYANOCOBALAMIN 1000 UG/ML
1000 INJECTION INTRAMUSCULAR; SUBCUTANEOUS
Qty: 0 | Refills: 0 | Status: COMPLETED | OUTPATIENT
Start: 2024-10-30

## 2024-10-30 RX ORDER — TACROLIMUS 1 MG/G
0.1 OINTMENT TOPICAL TWICE DAILY
Qty: 1 | Refills: 0 | Status: COMPLETED | COMMUNITY
Start: 2024-10-30 | End: 2024-10-30

## 2024-10-30 RX ORDER — TACROLIMUS/VEHICLE BASE NO.238 0.1 %
0.1 CREAM (GRAM) TOPICAL
Qty: 1 | Refills: 0 | Status: COMPLETED | COMMUNITY
Start: 2024-10-30 | End: 2024-10-30

## 2024-10-30 RX ADMIN — CYANOCOBALAMIN 0 MCG/ML: 1000 INJECTION INTRAMUSCULAR; SUBCUTANEOUS at 00:00

## 2024-11-06 ENCOUNTER — APPOINTMENT (OUTPATIENT)
Dept: OPHTHALMOLOGY | Facility: CLINIC | Age: 79
End: 2024-11-06
Payer: MEDICARE

## 2024-11-06 ENCOUNTER — NON-APPOINTMENT (OUTPATIENT)
Age: 79
End: 2024-11-06

## 2024-11-06 PROCEDURE — 92012 INTRM OPH EXAM EST PATIENT: CPT

## 2024-11-11 ENCOUNTER — APPOINTMENT (OUTPATIENT)
Dept: INTERNAL MEDICINE | Facility: CLINIC | Age: 79
End: 2024-11-11
Payer: MEDICARE

## 2024-11-11 VITALS
OXYGEN SATURATION: 96 % | HEART RATE: 67 BPM | BODY MASS INDEX: 17.66 KG/M2 | DIASTOLIC BLOOD PRESSURE: 76 MMHG | HEIGHT: 62 IN | SYSTOLIC BLOOD PRESSURE: 147 MMHG | WEIGHT: 96 LBS

## 2024-11-11 DIAGNOSIS — Z00.00 ENCOUNTER FOR GENERAL ADULT MEDICAL EXAMINATION W/OUT ABNORMAL FINDINGS: ICD-10-CM

## 2024-11-11 DIAGNOSIS — I10 ESSENTIAL (PRIMARY) HYPERTENSION: ICD-10-CM

## 2024-11-11 PROCEDURE — 36415 COLL VENOUS BLD VENIPUNCTURE: CPT

## 2024-11-11 PROCEDURE — G0439: CPT

## 2024-11-11 PROCEDURE — G0444 DEPRESSION SCREEN ANNUAL: CPT

## 2024-11-11 RX ORDER — HYDROCORTISONE 10 MG/G
1 OINTMENT TOPICAL TWICE DAILY
Qty: 1 | Refills: 0 | Status: ACTIVE | COMMUNITY
Start: 2024-11-11 | End: 1900-01-01

## 2024-11-15 ENCOUNTER — TRANSCRIPTION ENCOUNTER (OUTPATIENT)
Age: 79
End: 2024-11-15

## 2024-11-15 LAB
25(OH)D3 SERPL-MCNC: 46.6 NG/ML
ALBUMIN SERPL ELPH-MCNC: 4.5 G/DL
ALP BLD-CCNC: 82 U/L
ALT SERPL-CCNC: 15 U/L
ANION GAP SERPL CALC-SCNC: 12 MMOL/L
APPEARANCE: CLEAR
AST SERPL-CCNC: 15 U/L
BACTERIA: NEGATIVE /HPF
BASOPHILS # BLD AUTO: 0.04 K/UL
BASOPHILS NFR BLD AUTO: 0.9 %
BILIRUB SERPL-MCNC: 1.1 MG/DL
BILIRUBIN URINE: NEGATIVE
BLOOD URINE: NEGATIVE
BUN SERPL-MCNC: 20 MG/DL
CALCIUM SERPL-MCNC: 10.6 MG/DL
CAST: 0 /LPF
CHLORIDE SERPL-SCNC: 99 MMOL/L
CHOLEST SERPL-MCNC: 210 MG/DL
CO2 SERPL-SCNC: 27 MMOL/L
COLOR: YELLOW
CREAT SERPL-MCNC: 0.62 MG/DL
EGFR: 91 ML/MIN/1.73M2
EOSINOPHIL # BLD AUTO: 0.1 K/UL
EOSINOPHIL NFR BLD AUTO: 2.2 %
EPITHELIAL CELLS: 0 /HPF
ESTIMATED AVERAGE GLUCOSE: 105 MG/DL
FOLATE SERPL-MCNC: 8.7 NG/ML
GLUCOSE QUALITATIVE U: NEGATIVE MG/DL
GLUCOSE SERPL-MCNC: 95 MG/DL
HBA1C MFR BLD HPLC: 5.3 %
HCT VFR BLD CALC: 45.6 %
HDLC SERPL-MCNC: 90 MG/DL
HGB BLD-MCNC: 14.9 G/DL
IMM GRANULOCYTES NFR BLD AUTO: 0.2 %
KETONES URINE: NEGATIVE MG/DL
LDLC SERPL CALC-MCNC: 110 MG/DL
LEUKOCYTE ESTERASE URINE: NEGATIVE
LYMPHOCYTES # BLD AUTO: 0.98 K/UL
LYMPHOCYTES NFR BLD AUTO: 21.1 %
MAN DIFF?: NORMAL
MCHC RBC-ENTMCNC: 31.7 PG
MCHC RBC-ENTMCNC: 32.7 G/DL
MCV RBC AUTO: 97 FL
MICROSCOPIC-UA: NORMAL
MONOCYTES # BLD AUTO: 0.4 K/UL
MONOCYTES NFR BLD AUTO: 8.6 %
NEUTROPHILS # BLD AUTO: 3.11 K/UL
NEUTROPHILS NFR BLD AUTO: 67 %
NITRITE URINE: NEGATIVE
NONHDLC SERPL-MCNC: 120 MG/DL
PH URINE: 7
PLATELET # BLD AUTO: 361 K/UL
POTASSIUM SERPL-SCNC: 4.9 MMOL/L
PROT SERPL-MCNC: 6.7 G/DL
PROTEIN URINE: NEGATIVE MG/DL
RBC # BLD: 4.7 M/UL
RBC # FLD: 12 %
RED BLOOD CELLS URINE: 6 /HPF
REVIEW: NORMAL
SODIUM SERPL-SCNC: 137 MMOL/L
SPECIFIC GRAVITY URINE: 1.02
TRIGL SERPL-MCNC: 52 MG/DL
TSH SERPL-ACNC: 1.81 UIU/ML
UROBILINOGEN URINE: 0.2 MG/DL
VIT B12 SERPL-MCNC: 653 PG/ML
WBC # FLD AUTO: 4.64 K/UL
WHITE BLOOD CELLS URINE: 0 /HPF

## 2024-11-26 ENCOUNTER — APPOINTMENT (OUTPATIENT)
Dept: INTERNAL MEDICINE | Facility: CLINIC | Age: 79
End: 2024-11-26
Payer: MEDICARE

## 2024-11-26 VITALS
BODY MASS INDEX: 18.03 KG/M2 | TEMPERATURE: 96.5 F | HEART RATE: 62 BPM | SYSTOLIC BLOOD PRESSURE: 128 MMHG | WEIGHT: 98 LBS | DIASTOLIC BLOOD PRESSURE: 70 MMHG | HEIGHT: 62 IN | OXYGEN SATURATION: 97 %

## 2024-11-26 DIAGNOSIS — E53.8 DEFICIENCY OF OTHER SPECIFIED B GROUP VITAMINS: ICD-10-CM

## 2024-11-26 PROCEDURE — 99212 OFFICE O/P EST SF 10 MIN: CPT | Mod: 25

## 2024-11-26 PROCEDURE — G2211 COMPLEX E/M VISIT ADD ON: CPT

## 2024-11-26 PROCEDURE — 96372 THER/PROPH/DIAG INJ SC/IM: CPT

## 2024-11-26 RX ORDER — CYANOCOBALAMIN 1000 UG/ML
1000 INJECTION, SOLUTION INTRAMUSCULAR; SUBCUTANEOUS
Qty: 0 | Refills: 0 | Status: COMPLETED | OUTPATIENT
Start: 2024-11-26

## 2024-11-26 RX ADMIN — CYANOCOBALAMIN 0 MCG/ML: 1000 INJECTION, SOLUTION INTRAMUSCULAR; SUBCUTANEOUS at 00:00

## 2024-12-02 ENCOUNTER — APPOINTMENT (OUTPATIENT)
Dept: ENDOCRINOLOGY | Facility: CLINIC | Age: 79
End: 2024-12-02

## 2024-12-10 ENCOUNTER — RX RENEWAL (OUTPATIENT)
Age: 79
End: 2024-12-10

## 2024-12-18 ENCOUNTER — RX RENEWAL (OUTPATIENT)
Age: 79
End: 2024-12-18

## 2024-12-23 ENCOUNTER — APPOINTMENT (OUTPATIENT)
Dept: INTERNAL MEDICINE | Facility: CLINIC | Age: 79
End: 2024-12-23

## 2024-12-23 VITALS
SYSTOLIC BLOOD PRESSURE: 137 MMHG | HEIGHT: 62 IN | HEART RATE: 66 BPM | TEMPERATURE: 97.4 F | WEIGHT: 92.44 LBS | DIASTOLIC BLOOD PRESSURE: 60 MMHG | OXYGEN SATURATION: 100 % | BODY MASS INDEX: 17.01 KG/M2

## 2024-12-23 DIAGNOSIS — E53.8 DEFICIENCY OF OTHER SPECIFIED B GROUP VITAMINS: ICD-10-CM

## 2024-12-23 DIAGNOSIS — L30.9 DERMATITIS, UNSPECIFIED: ICD-10-CM

## 2024-12-23 PROCEDURE — G2211 COMPLEX E/M VISIT ADD ON: CPT

## 2024-12-23 PROCEDURE — 99213 OFFICE O/P EST LOW 20 MIN: CPT | Mod: 25

## 2024-12-23 PROCEDURE — 96372 THER/PROPH/DIAG INJ SC/IM: CPT

## 2024-12-23 RX ADMIN — CYANOCOBALAMIN 1 MCG/ML: 1000 INJECTION, SOLUTION INTRAMUSCULAR; SUBCUTANEOUS at 00:00

## 2025-01-08 ENCOUNTER — APPOINTMENT (OUTPATIENT)
Dept: OPHTHALMOLOGY | Facility: CLINIC | Age: 80
End: 2025-01-08
Payer: MEDICARE

## 2025-01-08 ENCOUNTER — NON-APPOINTMENT (OUTPATIENT)
Age: 80
End: 2025-01-08

## 2025-01-08 PROCEDURE — 92012 INTRM OPH EXAM EST PATIENT: CPT

## 2025-01-14 ENCOUNTER — APPOINTMENT (OUTPATIENT)
Dept: ENDOCRINOLOGY | Facility: CLINIC | Age: 80
End: 2025-01-14
Payer: MEDICARE

## 2025-01-14 PROCEDURE — 96372 THER/PROPH/DIAG INJ SC/IM: CPT

## 2025-01-21 ENCOUNTER — RX RENEWAL (OUTPATIENT)
Age: 80
End: 2025-01-21

## 2025-01-23 ENCOUNTER — APPOINTMENT (OUTPATIENT)
Dept: INTERNAL MEDICINE | Facility: CLINIC | Age: 80
End: 2025-01-23
Payer: MEDICARE

## 2025-01-23 VITALS
HEART RATE: 61 BPM | SYSTOLIC BLOOD PRESSURE: 128 MMHG | BODY MASS INDEX: 17.11 KG/M2 | HEIGHT: 62 IN | OXYGEN SATURATION: 100 % | WEIGHT: 93 LBS | DIASTOLIC BLOOD PRESSURE: 69 MMHG

## 2025-01-23 DIAGNOSIS — E53.8 DEFICIENCY OF OTHER SPECIFIED B GROUP VITAMINS: ICD-10-CM

## 2025-01-23 PROCEDURE — G2211 COMPLEX E/M VISIT ADD ON: CPT

## 2025-01-23 PROCEDURE — 99213 OFFICE O/P EST LOW 20 MIN: CPT | Mod: 25

## 2025-01-23 PROCEDURE — 96372 THER/PROPH/DIAG INJ SC/IM: CPT

## 2025-01-23 RX ORDER — CYANOCOBALAMIN 1000 UG/ML
1000 INJECTION, SOLUTION INTRAMUSCULAR; SUBCUTANEOUS
Qty: 0 | Refills: 0 | Status: COMPLETED | OUTPATIENT
Start: 2025-01-23

## 2025-01-23 RX ADMIN — CYANOCOBALAMIN 0 MCG/ML: 1000 INJECTION, SOLUTION INTRAMUSCULAR; SUBCUTANEOUS at 00:00

## 2025-01-29 ENCOUNTER — APPOINTMENT (OUTPATIENT)
Dept: OTOLARYNGOLOGY | Facility: CLINIC | Age: 80
End: 2025-01-29
Payer: MEDICARE

## 2025-01-29 ENCOUNTER — APPOINTMENT (OUTPATIENT)
Dept: PHARMACY | Facility: CLINIC | Age: 80
End: 2025-01-29
Payer: SELF-PAY

## 2025-01-29 VITALS
TEMPERATURE: 98.5 F | OXYGEN SATURATION: 98 % | BODY MASS INDEX: 17.11 KG/M2 | HEIGHT: 62 IN | HEART RATE: 67 BPM | SYSTOLIC BLOOD PRESSURE: 144 MMHG | WEIGHT: 93 LBS | DIASTOLIC BLOOD PRESSURE: 72 MMHG

## 2025-01-29 DIAGNOSIS — S02.2XXA FRACTURE OF NASAL BONES, INITIAL ENCOUNTER FOR CLOSED FRACTURE: ICD-10-CM

## 2025-01-29 PROCEDURE — 31231 NASAL ENDOSCOPY DX: CPT

## 2025-01-29 PROCEDURE — 99213 OFFICE O/P EST LOW 20 MIN: CPT | Mod: 25

## 2025-01-29 PROCEDURE — 99203 OFFICE O/P NEW LOW 30 MIN: CPT | Mod: 25

## 2025-02-03 ENCOUNTER — NON-APPOINTMENT (OUTPATIENT)
Age: 80
End: 2025-02-03

## 2025-02-12 ENCOUNTER — NON-APPOINTMENT (OUTPATIENT)
Age: 80
End: 2025-02-12

## 2025-02-25 ENCOUNTER — APPOINTMENT (OUTPATIENT)
Dept: ORTHOPEDIC SURGERY | Facility: CLINIC | Age: 80
End: 2025-02-25

## 2025-02-26 ENCOUNTER — APPOINTMENT (OUTPATIENT)
Dept: INTERNAL MEDICINE | Facility: CLINIC | Age: 80
End: 2025-02-26
Payer: MEDICARE

## 2025-02-26 VITALS
BODY MASS INDEX: 16.93 KG/M2 | DIASTOLIC BLOOD PRESSURE: 67 MMHG | WEIGHT: 92 LBS | OXYGEN SATURATION: 99 % | HEART RATE: 64 BPM | TEMPERATURE: 97.3 F | SYSTOLIC BLOOD PRESSURE: 121 MMHG | HEIGHT: 62 IN

## 2025-02-26 DIAGNOSIS — E53.8 DEFICIENCY OF OTHER SPECIFIED B GROUP VITAMINS: ICD-10-CM

## 2025-02-26 PROCEDURE — 96372 THER/PROPH/DIAG INJ SC/IM: CPT

## 2025-02-26 PROCEDURE — 99213 OFFICE O/P EST LOW 20 MIN: CPT | Mod: 25

## 2025-02-26 RX ORDER — CYANOCOBALAMIN 1000 UG/ML
1000 INJECTION, SOLUTION INTRAMUSCULAR; SUBCUTANEOUS
Qty: 0 | Refills: 0 | Status: COMPLETED | OUTPATIENT
Start: 2025-02-26

## 2025-02-26 RX ADMIN — CYANOCOBALAMIN 0 MCG/ML: 1000 INJECTION, SOLUTION INTRAMUSCULAR; SUBCUTANEOUS at 00:00

## 2025-03-07 ENCOUNTER — APPOINTMENT (OUTPATIENT)
Dept: PHARMACY | Facility: CLINIC | Age: 80
End: 2025-03-07
Payer: SELF-PAY

## 2025-03-07 PROCEDURE — V5221J: CUSTOM

## 2025-03-14 ENCOUNTER — RX RENEWAL (OUTPATIENT)
Age: 80
End: 2025-03-14

## 2025-03-24 ENCOUNTER — APPOINTMENT (OUTPATIENT)
Dept: PHARMACY | Facility: CLINIC | Age: 80
End: 2025-03-24
Payer: SELF-PAY

## 2025-03-24 PROCEDURE — V5299A: CUSTOM

## 2025-03-24 NOTE — END OF VISIT
[FreeTextEntry3] :   This note was written by Viri Carrion PA-C, acting as a scribe for Dr. Scott Tomas. I, Dr. Scott Tomas, have read and attest that all the information, medical decision-making, and discharge instructions within are true and accurate.  I, Dr. Scott Tomas, personally performed the evaluation and management (E/M) services for this new patient. That E/M includes conducting the initial examination, assessing all conditions, and establishing the plan of care. Today, my ACP, Viri Carrion PA-C, was here to observe my evaluation and management services for this patient to be followed going forward.
Verbal/written post procedure instructions were given to patient/caregiver
Care for catheter as per unit/ICU protocols

## 2025-03-26 ENCOUNTER — APPOINTMENT (OUTPATIENT)
Dept: INTERNAL MEDICINE | Facility: CLINIC | Age: 80
End: 2025-03-26
Payer: MEDICARE

## 2025-03-26 VITALS
HEART RATE: 78 BPM | HEIGHT: 62 IN | BODY MASS INDEX: 16.93 KG/M2 | SYSTOLIC BLOOD PRESSURE: 142 MMHG | OXYGEN SATURATION: 100 % | DIASTOLIC BLOOD PRESSURE: 68 MMHG | WEIGHT: 92 LBS

## 2025-03-26 DIAGNOSIS — E53.8 DEFICIENCY OF OTHER SPECIFIED B GROUP VITAMINS: ICD-10-CM

## 2025-03-26 DIAGNOSIS — R05.3 CHRONIC COUGH: ICD-10-CM

## 2025-03-26 DIAGNOSIS — I10 ESSENTIAL (PRIMARY) HYPERTENSION: ICD-10-CM

## 2025-03-26 PROCEDURE — 99213 OFFICE O/P EST LOW 20 MIN: CPT | Mod: 25

## 2025-03-26 PROCEDURE — 96372 THER/PROPH/DIAG INJ SC/IM: CPT

## 2025-03-26 RX ORDER — CYANOCOBALAMIN 1000 UG/ML
1000 INJECTION, SOLUTION INTRAMUSCULAR; SUBCUTANEOUS
Qty: 0 | Refills: 0 | Status: COMPLETED | OUTPATIENT
Start: 2025-03-26

## 2025-03-26 RX ORDER — BENZONATATE 100 MG/1
100 CAPSULE ORAL 3 TIMES DAILY
Qty: 30 | Refills: 0 | Status: ACTIVE | COMMUNITY
Start: 2025-03-26 | End: 1900-01-01

## 2025-03-26 RX ADMIN — CYANOCOBALAMIN 0 MCG/ML: 1000 INJECTION, SOLUTION INTRAMUSCULAR; SUBCUTANEOUS at 00:00

## 2025-03-31 ENCOUNTER — APPOINTMENT (OUTPATIENT)
Dept: INTERNAL MEDICINE | Facility: CLINIC | Age: 80
End: 2025-03-31
Payer: MEDICARE

## 2025-03-31 VITALS
HEIGHT: 62 IN | HEART RATE: 65 BPM | TEMPERATURE: 97.4 F | SYSTOLIC BLOOD PRESSURE: 142 MMHG | BODY MASS INDEX: 16.93 KG/M2 | OXYGEN SATURATION: 96 % | WEIGHT: 92 LBS | DIASTOLIC BLOOD PRESSURE: 68 MMHG

## 2025-03-31 VITALS — SYSTOLIC BLOOD PRESSURE: 130 MMHG | DIASTOLIC BLOOD PRESSURE: 71 MMHG

## 2025-03-31 DIAGNOSIS — J40 BRONCHITIS, NOT SPECIFIED AS ACUTE OR CHRONIC: ICD-10-CM

## 2025-03-31 DIAGNOSIS — R09.89 OTHER SPECIFIED SYMPTOMS AND SIGNS INVOLVING THE CIRCULATORY AND RESPIRATORY SYSTEMS: ICD-10-CM

## 2025-03-31 PROCEDURE — G2211 COMPLEX E/M VISIT ADD ON: CPT

## 2025-03-31 PROCEDURE — 99213 OFFICE O/P EST LOW 20 MIN: CPT

## 2025-03-31 RX ORDER — GUAIFENESIN AND DEXTROMETHORPHAN 20; 200 MG/10ML; MG/10ML
20-200 SYRUP ORAL
Qty: 1 | Refills: 1 | Status: ACTIVE | COMMUNITY
Start: 2025-03-31 | End: 1900-01-01

## 2025-03-31 RX ORDER — FLUTICASONE PROPIONATE AND SALMETEROL 100; 50 UG/1; UG/1
100-50 POWDER RESPIRATORY (INHALATION)
Qty: 1 | Refills: 0 | Status: ACTIVE | COMMUNITY
Start: 2025-03-31 | End: 1900-01-01

## 2025-04-01 PROBLEM — J40 BRONCHITIS: Status: ACTIVE | Noted: 2025-03-31

## 2025-04-07 ENCOUNTER — TRANSCRIPTION ENCOUNTER (OUTPATIENT)
Age: 80
End: 2025-04-07

## 2025-04-07 DIAGNOSIS — R93.89 ABNORMAL FINDINGS ON DIAGNOSTIC IMAGING OF OTHER SPECIFIED BODY STRUCTURES: ICD-10-CM

## 2025-04-09 ENCOUNTER — TRANSCRIPTION ENCOUNTER (OUTPATIENT)
Age: 80
End: 2025-04-09

## 2025-04-11 ENCOUNTER — TRANSCRIPTION ENCOUNTER (OUTPATIENT)
Age: 80
End: 2025-04-11

## 2025-04-17 ENCOUNTER — APPOINTMENT (OUTPATIENT)
Dept: PHARMACY | Facility: CLINIC | Age: 80
End: 2025-04-17
Payer: SELF-PAY

## 2025-04-17 PROCEDURE — V5299A: CUSTOM

## 2025-04-18 ENCOUNTER — RX RENEWAL (OUTPATIENT)
Age: 80
End: 2025-04-18

## 2025-04-23 ENCOUNTER — APPOINTMENT (OUTPATIENT)
Dept: INTERNAL MEDICINE | Facility: CLINIC | Age: 80
End: 2025-04-23
Payer: MEDICARE

## 2025-04-23 ENCOUNTER — MED ADMIN CHARGE (OUTPATIENT)
Age: 80
End: 2025-04-23

## 2025-04-23 VITALS
HEIGHT: 62 IN | DIASTOLIC BLOOD PRESSURE: 80 MMHG | BODY MASS INDEX: 17.66 KG/M2 | HEART RATE: 60 BPM | WEIGHT: 96 LBS | OXYGEN SATURATION: 100 % | SYSTOLIC BLOOD PRESSURE: 126 MMHG

## 2025-04-23 DIAGNOSIS — Z87.891 PERSONAL HISTORY OF NICOTINE DEPENDENCE: ICD-10-CM

## 2025-04-23 DIAGNOSIS — M25.552 PAIN IN LEFT HIP: ICD-10-CM

## 2025-04-23 DIAGNOSIS — M54.50 LOW BACK PAIN, UNSPECIFIED: ICD-10-CM

## 2025-04-23 DIAGNOSIS — G89.29 LOW BACK PAIN, UNSPECIFIED: ICD-10-CM

## 2025-04-23 DIAGNOSIS — E53.8 DEFICIENCY OF OTHER SPECIFIED B GROUP VITAMINS: ICD-10-CM

## 2025-04-23 DIAGNOSIS — I10 ESSENTIAL (PRIMARY) HYPERTENSION: ICD-10-CM

## 2025-04-23 PROCEDURE — 99214 OFFICE O/P EST MOD 30 MIN: CPT | Mod: 25

## 2025-04-23 PROCEDURE — 96372 THER/PROPH/DIAG INJ SC/IM: CPT

## 2025-04-23 RX ORDER — MELOXICAM 7.5 MG/1
7.5 TABLET ORAL
Qty: 15 | Refills: 0 | Status: ACTIVE | COMMUNITY
Start: 2025-04-23 | End: 1900-01-01

## 2025-04-23 RX ORDER — CYANOCOBALAMIN 1000 UG/ML
1000 INJECTION, SOLUTION INTRAMUSCULAR; SUBCUTANEOUS
Qty: 0 | Refills: 0 | Status: COMPLETED | OUTPATIENT
Start: 2025-04-23

## 2025-04-23 RX ADMIN — CYANOCOBALAMIN 0 MCG/ML: 1000 INJECTION, SOLUTION INTRAMUSCULAR; SUBCUTANEOUS at 00:00

## 2025-04-29 ENCOUNTER — APPOINTMENT (OUTPATIENT)
Dept: ORTHOPEDIC SURGERY | Facility: CLINIC | Age: 80
End: 2025-04-29

## 2025-04-29 ENCOUNTER — APPOINTMENT (OUTPATIENT)
Dept: PHARMACY | Facility: CLINIC | Age: 80
End: 2025-04-29
Payer: SELF-PAY

## 2025-04-29 VITALS
OXYGEN SATURATION: 98 % | BODY MASS INDEX: 17.66 KG/M2 | DIASTOLIC BLOOD PRESSURE: 78 MMHG | HEART RATE: 69 BPM | SYSTOLIC BLOOD PRESSURE: 143 MMHG | HEIGHT: 62 IN | WEIGHT: 96 LBS

## 2025-04-29 DIAGNOSIS — M81.0 AGE-RELATED OSTEOPOROSIS W/OUT CURRENT PATHOLOGICAL FRACTURE: ICD-10-CM

## 2025-04-29 DIAGNOSIS — M48.061 SPINAL STENOSIS, LUMBAR REGION WITHOUT NEUROGENIC CLAUDICATION: ICD-10-CM

## 2025-04-29 PROCEDURE — V5299K: CUSTOM

## 2025-04-29 PROCEDURE — V5264E: CUSTOM | Mod: LT,RT

## 2025-04-29 PROCEDURE — XXXXX: CPT | Mod: 1L

## 2025-04-29 RX ORDER — MELOXICAM 7.5 MG/1
7.5 TABLET ORAL TWICE DAILY
Qty: 60 | Refills: 0 | Status: ACTIVE | COMMUNITY
Start: 2025-04-29 | End: 1900-01-01

## 2025-05-21 ENCOUNTER — APPOINTMENT (OUTPATIENT)
Dept: PHARMACY | Facility: CLINIC | Age: 80
End: 2025-05-21

## 2025-05-27 ENCOUNTER — APPOINTMENT (OUTPATIENT)
Dept: INTERNAL MEDICINE | Facility: CLINIC | Age: 80
End: 2025-05-27
Payer: MEDICARE

## 2025-05-27 VITALS
HEART RATE: 58 BPM | SYSTOLIC BLOOD PRESSURE: 127 MMHG | BODY MASS INDEX: 17.52 KG/M2 | OXYGEN SATURATION: 96 % | WEIGHT: 95.19 LBS | HEIGHT: 62 IN | DIASTOLIC BLOOD PRESSURE: 66 MMHG

## 2025-05-27 DIAGNOSIS — E53.8 DEFICIENCY OF OTHER SPECIFIED B GROUP VITAMINS: ICD-10-CM

## 2025-05-27 DIAGNOSIS — I10 ESSENTIAL (PRIMARY) HYPERTENSION: ICD-10-CM

## 2025-05-27 PROCEDURE — 96372 THER/PROPH/DIAG INJ SC/IM: CPT

## 2025-05-27 PROCEDURE — 99213 OFFICE O/P EST LOW 20 MIN: CPT | Mod: 25

## 2025-05-27 RX ORDER — CYANOCOBALAMIN 1000 UG/ML
1000 INJECTION, SOLUTION INTRAMUSCULAR; SUBCUTANEOUS
Qty: 0 | Refills: 0 | Status: COMPLETED | OUTPATIENT
Start: 2025-05-27

## 2025-05-27 RX ADMIN — CYANOCOBALAMIN 1 MCG/ML: 1000 INJECTION, SOLUTION INTRAMUSCULAR; SUBCUTANEOUS at 00:00

## 2025-06-10 ENCOUNTER — NON-APPOINTMENT (OUTPATIENT)
Age: 80
End: 2025-06-10

## 2025-06-19 ENCOUNTER — RX RENEWAL (OUTPATIENT)
Age: 80
End: 2025-06-19

## 2025-06-23 ENCOUNTER — MED ADMIN CHARGE (OUTPATIENT)
Age: 80
End: 2025-06-23

## 2025-06-23 ENCOUNTER — APPOINTMENT (OUTPATIENT)
Dept: INTERNAL MEDICINE | Facility: CLINIC | Age: 80
End: 2025-06-23
Payer: MEDICARE

## 2025-06-23 VITALS — SYSTOLIC BLOOD PRESSURE: 123 MMHG | DIASTOLIC BLOOD PRESSURE: 70 MMHG

## 2025-06-23 VITALS
OXYGEN SATURATION: 95 % | WEIGHT: 96 LBS | TEMPERATURE: 97.4 F | BODY MASS INDEX: 18.36 KG/M2 | HEIGHT: 60.63 IN | DIASTOLIC BLOOD PRESSURE: 83 MMHG | HEART RATE: 88 BPM | SYSTOLIC BLOOD PRESSURE: 137 MMHG

## 2025-06-23 PROCEDURE — 96372 THER/PROPH/DIAG INJ SC/IM: CPT

## 2025-06-23 PROCEDURE — 99214 OFFICE O/P EST MOD 30 MIN: CPT | Mod: 25

## 2025-06-23 RX ORDER — CYANOCOBALAMIN 1000 UG/ML
1000 INJECTION, SOLUTION INTRAMUSCULAR; SUBCUTANEOUS
Qty: 0 | Refills: 0 | Status: COMPLETED | OUTPATIENT
Start: 2025-06-23

## 2025-06-23 RX ADMIN — CYANOCOBALAMIN 0 MCG/ML: 1000 INJECTION, SOLUTION INTRAMUSCULAR at 00:00

## 2025-06-24 ENCOUNTER — APPOINTMENT (OUTPATIENT)
Dept: PHARMACY | Facility: CLINIC | Age: 80
End: 2025-06-24
Payer: MEDICARE

## 2025-06-24 ENCOUNTER — APPOINTMENT (OUTPATIENT)
Dept: OTOLARYNGOLOGY | Facility: CLINIC | Age: 80
End: 2025-06-24
Payer: MEDICARE

## 2025-06-24 VITALS
HEART RATE: 65 BPM | BODY MASS INDEX: 18.85 KG/M2 | HEIGHT: 60 IN | TEMPERATURE: 98.8 F | SYSTOLIC BLOOD PRESSURE: 138 MMHG | OXYGEN SATURATION: 98 % | WEIGHT: 96 LBS | DIASTOLIC BLOOD PRESSURE: 73 MMHG

## 2025-06-24 PROBLEM — H61.23 BILATERAL IMPACTED CERUMEN: Status: ACTIVE | Noted: 2025-06-24

## 2025-06-24 PROCEDURE — 99213 OFFICE O/P EST LOW 20 MIN: CPT

## 2025-06-24 PROCEDURE — V5299A: CUSTOM

## 2025-06-27 ENCOUNTER — APPOINTMENT (OUTPATIENT)
Dept: PHARMACY | Facility: CLINIC | Age: 80
End: 2025-06-27

## 2025-06-27 ENCOUNTER — APPOINTMENT (OUTPATIENT)
Dept: ENDOCRINOLOGY | Facility: CLINIC | Age: 80
End: 2025-06-27
Payer: MEDICARE

## 2025-06-27 VITALS
SYSTOLIC BLOOD PRESSURE: 126 MMHG | HEART RATE: 62 BPM | DIASTOLIC BLOOD PRESSURE: 74 MMHG | WEIGHT: 96 LBS | BODY MASS INDEX: 18.75 KG/M2

## 2025-06-27 PROCEDURE — 36415 COLL VENOUS BLD VENIPUNCTURE: CPT

## 2025-06-27 PROCEDURE — 92593: CPT | Mod: NC

## 2025-06-27 PROCEDURE — 99214 OFFICE O/P EST MOD 30 MIN: CPT | Mod: 25

## 2025-06-27 RX ORDER — CALCIUM CITRATE/VITAMIN D3 315MG-6.25
TABLET ORAL
Refills: 0 | Status: ACTIVE | COMMUNITY

## 2025-06-28 LAB
25(OH)D3 SERPL-MCNC: 49.9 NG/ML
ALBUMIN SERPL ELPH-MCNC: 4.6 G/DL
ALP BLD-CCNC: 47 U/L
ALT SERPL-CCNC: 20 U/L
ANION GAP SERPL CALC-SCNC: 15 MMOL/L
AST SERPL-CCNC: 25 U/L
BILIRUB SERPL-MCNC: 1.2 MG/DL
BUN SERPL-MCNC: 16 MG/DL
CALCIUM SERPL-MCNC: 10.2 MG/DL
CALCIUM SERPL-MCNC: 10.2 MG/DL
CHLORIDE SERPL-SCNC: 99 MMOL/L
CO2 SERPL-SCNC: 25 MMOL/L
CREAT SERPL-MCNC: 0.58 MG/DL
EGFRCR SERPLBLD CKD-EPI 2021: 92 ML/MIN/1.73M2
GLUCOSE SERPL-MCNC: 89 MG/DL
MAGNESIUM SERPL-MCNC: 2.3 MG/DL
PARATHYROID HORMONE INTACT: 48 PG/ML
PHOSPHATE SERPL-MCNC: 3.6 MG/DL
POTASSIUM SERPL-SCNC: 4.7 MMOL/L
PROT SERPL-MCNC: 6.6 G/DL
SODIUM SERPL-SCNC: 139 MMOL/L

## 2025-07-14 ENCOUNTER — APPOINTMENT (OUTPATIENT)
Dept: INFUSION THERAPY | Facility: CLINIC | Age: 80
End: 2025-07-14

## 2025-07-14 ENCOUNTER — OUTPATIENT (OUTPATIENT)
Dept: OUTPATIENT SERVICES | Facility: HOSPITAL | Age: 80
LOS: 1 days | End: 2025-07-14
Payer: MEDICARE

## 2025-07-14 VITALS
OXYGEN SATURATION: 98 % | HEART RATE: 73 BPM | HEIGHT: 60 IN | SYSTOLIC BLOOD PRESSURE: 137 MMHG | RESPIRATION RATE: 18 BRPM | WEIGHT: 93.04 LBS | TEMPERATURE: 97 F | DIASTOLIC BLOOD PRESSURE: 81 MMHG

## 2025-07-14 DIAGNOSIS — Z90.49 ACQUIRED ABSENCE OF OTHER SPECIFIED PARTS OF DIGESTIVE TRACT: Chronic | ICD-10-CM

## 2025-07-14 DIAGNOSIS — M81.0 AGE-RELATED OSTEOPOROSIS WITHOUT CURRENT PATHOLOGICAL FRACTURE: ICD-10-CM

## 2025-07-14 DIAGNOSIS — Z90.89 ACQUIRED ABSENCE OF OTHER ORGANS: Chronic | ICD-10-CM

## 2025-07-14 DIAGNOSIS — Z98.890 OTHER SPECIFIED POSTPROCEDURAL STATES: Chronic | ICD-10-CM

## 2025-07-14 DIAGNOSIS — Z98.49 CATARACT EXTRACTION STATUS, UNSPECIFIED EYE: Chronic | ICD-10-CM

## 2025-07-14 PROCEDURE — 96372 THER/PROPH/DIAG INJ SC/IM: CPT

## 2025-07-14 RX ORDER — DENOSUMAB 60 MG/ML
60 INJECTION SUBCUTANEOUS ONCE
Refills: 0 | Status: COMPLETED | OUTPATIENT
Start: 2025-07-14 | End: 2025-07-14

## 2025-07-14 RX ADMIN — DENOSUMAB 60 MILLIGRAM(S): 60 INJECTION SUBCUTANEOUS at 11:30

## 2025-07-15 ENCOUNTER — RX RENEWAL (OUTPATIENT)
Age: 80
End: 2025-07-15

## 2025-07-22 ENCOUNTER — APPOINTMENT (OUTPATIENT)
Dept: INTERNAL MEDICINE | Facility: CLINIC | Age: 80
End: 2025-07-22

## 2025-08-01 RX ORDER — MELOXICAM 7.5 MG/1
7.5 TABLET ORAL TWICE DAILY
Qty: 28 | Refills: 0 | Status: ACTIVE | COMMUNITY
Start: 2025-08-01 | End: 1900-01-01

## 2025-08-04 ENCOUNTER — APPOINTMENT (OUTPATIENT)
Dept: PHARMACY | Facility: CLINIC | Age: 80
End: 2025-08-04
Payer: MEDICARE

## 2025-08-04 ENCOUNTER — APPOINTMENT (OUTPATIENT)
Dept: OTOLARYNGOLOGY | Facility: CLINIC | Age: 80
End: 2025-08-04
Payer: MEDICARE

## 2025-08-04 PROCEDURE — 92557 COMPREHENSIVE HEARING TEST: CPT

## 2025-08-04 PROCEDURE — 92567 TYMPANOMETRY: CPT

## 2025-08-04 PROCEDURE — V5299A: CUSTOM

## 2025-08-06 ENCOUNTER — MED ADMIN CHARGE (OUTPATIENT)
Age: 80
End: 2025-08-06

## 2025-08-06 ENCOUNTER — APPOINTMENT (OUTPATIENT)
Dept: INTERNAL MEDICINE | Facility: CLINIC | Age: 80
End: 2025-08-06
Payer: MEDICARE

## 2025-08-06 VITALS
HEIGHT: 60 IN | OXYGEN SATURATION: 99 % | TEMPERATURE: 98.1 F | DIASTOLIC BLOOD PRESSURE: 74 MMHG | BODY MASS INDEX: 18.27 KG/M2 | WEIGHT: 93.06 LBS | SYSTOLIC BLOOD PRESSURE: 133 MMHG | HEART RATE: 81 BPM

## 2025-08-06 DIAGNOSIS — I10 ESSENTIAL (PRIMARY) HYPERTENSION: ICD-10-CM

## 2025-08-06 DIAGNOSIS — E53.8 DEFICIENCY OF OTHER SPECIFIED B GROUP VITAMINS: ICD-10-CM

## 2025-08-06 PROCEDURE — 99214 OFFICE O/P EST MOD 30 MIN: CPT | Mod: 25

## 2025-08-06 PROCEDURE — 96372 THER/PROPH/DIAG INJ SC/IM: CPT

## 2025-08-06 RX ORDER — CYANOCOBALAMIN 1000 UG/ML
1000 INJECTION, SOLUTION INTRAMUSCULAR; SUBCUTANEOUS
Qty: 0 | Refills: 0 | Status: COMPLETED | OUTPATIENT
Start: 2025-08-06

## 2025-08-06 RX ADMIN — CYANOCOBALAMIN 0 MCG/ML: 1000 INJECTION, SOLUTION INTRAMUSCULAR at 00:00

## 2025-08-20 ENCOUNTER — RX RENEWAL (OUTPATIENT)
Age: 80
End: 2025-08-20

## 2025-09-15 ENCOUNTER — APPOINTMENT (OUTPATIENT)
Dept: INTERNAL MEDICINE | Facility: CLINIC | Age: 80
End: 2025-09-15
Payer: MEDICARE

## 2025-09-15 VITALS
WEIGHT: 87.31 LBS | OXYGEN SATURATION: 99 % | SYSTOLIC BLOOD PRESSURE: 150 MMHG | HEIGHT: 60 IN | TEMPERATURE: 97.3 F | HEART RATE: 64 BPM | BODY MASS INDEX: 17.14 KG/M2 | DIASTOLIC BLOOD PRESSURE: 65 MMHG

## 2025-09-15 DIAGNOSIS — M81.0 AGE-RELATED OSTEOPOROSIS W/OUT CURRENT PATHOLOGICAL FRACTURE: ICD-10-CM

## 2025-09-15 DIAGNOSIS — E53.8 DEFICIENCY OF OTHER SPECIFIED B GROUP VITAMINS: ICD-10-CM

## 2025-09-15 DIAGNOSIS — I10 ESSENTIAL (PRIMARY) HYPERTENSION: ICD-10-CM

## 2025-09-15 PROCEDURE — 99213 OFFICE O/P EST LOW 20 MIN: CPT | Mod: 25

## 2025-09-15 PROCEDURE — 96372 THER/PROPH/DIAG INJ SC/IM: CPT

## 2025-09-15 RX ORDER — MELOXICAM 7.5 MG/1
7.5 TABLET ORAL TWICE DAILY
Qty: 28 | Refills: 0 | Status: ACTIVE | COMMUNITY
Start: 2025-09-15 | End: 1900-01-01

## 2025-09-15 RX ORDER — CYANOCOBALAMIN 1000 UG/ML
1000 INJECTION, SOLUTION INTRAMUSCULAR; SUBCUTANEOUS
Qty: 0 | Refills: 0 | Status: COMPLETED | OUTPATIENT
Start: 2025-09-15

## 2025-09-15 RX ADMIN — CYANOCOBALAMIN 0 MCG/ML: 1000 INJECTION, SOLUTION INTRAMUSCULAR; SUBCUTANEOUS at 00:00

## (undated) DEVICE — KIT CENTURION ANTERIOR

## (undated) DEVICE — GOWN ROYAL SILK XL

## (undated) DEVICE — KNIFE ALCON CRESCENT ANGLED BEVEL UP 2.3MM (PINK)

## (undated) DEVICE — GLV 6 PROTEXIS (WHITE)

## (undated) DEVICE — KNIFE FULL HANDLE ANGLE 2.75MM

## (undated) DEVICE — PREP BETADINE 5% STERILE OPTHALMIC SOLUTION

## (undated) DEVICE — PACK ANTERIOR SEGMENT

## (undated) DEVICE — PACK CENTURION 2.75MM

## (undated) DEVICE — DRAPE MICROSCOPE KNOB COVER SMALL (2 PCS)

## (undated) DEVICE — SOL IRR BAG BSS 500ML

## (undated) DEVICE — KNIFE ALCON MVR V-LANCE 20G (WHITE)

## (undated) DEVICE — NDL HYPO SAFE 25G X 5/8" (ORANGE)

## (undated) DEVICE — NDL RETROBULBAR VISITEC 25X1.5

## (undated) DEVICE — Device

## (undated) DEVICE — CAPSULE GUARD I/A

## (undated) DEVICE — GLV 7.5 PROTEXIS (WHITE)

## (undated) DEVICE — SUT NYLON 10-0 12" CU-5